# Patient Record
Sex: FEMALE | Race: WHITE
[De-identification: names, ages, dates, MRNs, and addresses within clinical notes are randomized per-mention and may not be internally consistent; named-entity substitution may affect disease eponyms.]

---

## 2018-04-09 ENCOUNTER — HOSPITAL ENCOUNTER (OUTPATIENT)
Dept: HOSPITAL 62 - OD | Age: 23
End: 2018-04-09
Attending: FAMILY MEDICINE
Payer: COMMERCIAL

## 2018-04-09 DIAGNOSIS — E66.3: Primary | ICD-10-CM

## 2018-04-09 DIAGNOSIS — Z82.3: ICD-10-CM

## 2018-04-09 LAB
ADD MANUAL DIFF: NO
ALBUMIN SERPL-MCNC: 4.5 G/DL (ref 3.5–5)
ALP SERPL-CCNC: 73 U/L (ref 38–126)
ALT SERPL-CCNC: 31 U/L (ref 9–52)
ANION GAP SERPL CALC-SCNC: 8 MMOL/L (ref 5–19)
AST SERPL-CCNC: 21 U/L (ref 14–36)
BASOPHILS # BLD AUTO: 0 10^3/UL (ref 0–0.2)
BASOPHILS NFR BLD AUTO: 0.6 % (ref 0–2)
BILIRUB DIRECT SERPL-MCNC: 0.1 MG/DL (ref 0–0.4)
BILIRUB SERPL-MCNC: 0.3 MG/DL (ref 0.2–1.3)
BUN SERPL-MCNC: 20 MG/DL (ref 7–20)
CALCIUM: 10 MG/DL (ref 8.4–10.2)
CHLORIDE SERPL-SCNC: 105 MMOL/L (ref 98–107)
CO2 SERPL-SCNC: 30 MMOL/L (ref 22–30)
EOSINOPHIL # BLD AUTO: 0.1 10^3/UL (ref 0–0.6)
EOSINOPHIL NFR BLD AUTO: 2.5 % (ref 0–6)
ERYTHROCYTE [DISTWIDTH] IN BLOOD BY AUTOMATED COUNT: 13.4 % (ref 11.5–14)
GLUCOSE SERPL-MCNC: 85 MG/DL (ref 75–110)
HCT VFR BLD CALC: 35.6 % (ref 36–47)
HGB BLD-MCNC: 11.9 G/DL (ref 12–15.5)
LDLC SERPL DIRECT ASSAY-MCNC: 103 MG/DL (ref ?–100)
LYMPHOCYTES # BLD AUTO: 1.5 10^3/UL (ref 0.5–4.7)
LYMPHOCYTES NFR BLD AUTO: 28.3 % (ref 13–45)
MCH RBC QN AUTO: 28 PG (ref 27–33.4)
MCHC RBC AUTO-ENTMCNC: 33.5 G/DL (ref 32–36)
MCV RBC AUTO: 84 FL (ref 80–97)
MONOCYTES # BLD AUTO: 0.4 10^3/UL (ref 0.1–1.4)
MONOCYTES NFR BLD AUTO: 6.9 % (ref 3–13)
NEUTROPHILS # BLD AUTO: 3.4 10^3/UL (ref 1.7–8.2)
NEUTS SEG NFR BLD AUTO: 61.7 % (ref 42–78)
PLATELET # BLD: 198 10^3/UL (ref 150–450)
POTASSIUM SERPL-SCNC: 4.9 MMOL/L (ref 3.6–5)
PROT SERPL-MCNC: 7.3 G/DL (ref 6.3–8.2)
RBC # BLD AUTO: 4.26 10^6/UL (ref 3.72–5.28)
SODIUM SERPL-SCNC: 143.4 MMOL/L (ref 137–145)
TOTAL CELLS COUNTED % (AUTO): 100 %
TRIGL SERPL-MCNC: 36 MG/DL (ref ?–150)
VLDLC SERPL CALC-MCNC: 7 MG/DL (ref 10–31)
WBC # BLD AUTO: 5.4 10^3/UL (ref 4–10.5)

## 2018-04-09 PROCEDURE — 80053 COMPREHEN METABOLIC PANEL: CPT

## 2018-04-09 PROCEDURE — 80061 LIPID PANEL: CPT

## 2018-04-09 PROCEDURE — 36415 COLL VENOUS BLD VENIPUNCTURE: CPT

## 2018-04-09 PROCEDURE — 84443 ASSAY THYROID STIM HORMONE: CPT

## 2018-04-09 PROCEDURE — 85025 COMPLETE CBC W/AUTO DIFF WBC: CPT

## 2018-09-02 ENCOUNTER — HOSPITAL ENCOUNTER (EMERGENCY)
Dept: HOSPITAL 62 - ER | Age: 23
Discharge: HOME | End: 2018-09-02
Payer: MEDICAID

## 2018-09-02 VITALS — DIASTOLIC BLOOD PRESSURE: 56 MMHG | SYSTOLIC BLOOD PRESSURE: 118 MMHG

## 2018-09-02 DIAGNOSIS — O26.891: ICD-10-CM

## 2018-09-02 DIAGNOSIS — Z3A.11: ICD-10-CM

## 2018-09-02 DIAGNOSIS — R10.32: ICD-10-CM

## 2018-09-02 DIAGNOSIS — O20.9: Primary | ICD-10-CM

## 2018-09-02 LAB
ADD MANUAL DIFF: NO
ALBUMIN SERPL-MCNC: 4 G/DL (ref 3.5–5)
ALP SERPL-CCNC: 54 U/L (ref 38–126)
ALT SERPL-CCNC: 21 U/L (ref 9–52)
ANION GAP SERPL CALC-SCNC: 11 MMOL/L (ref 5–19)
APPEARANCE UR: CLEAR
APTT PPP: YELLOW S
AST SERPL-CCNC: 18 U/L (ref 14–36)
BASOPHILS # BLD AUTO: 0 10^3/UL (ref 0–0.2)
BASOPHILS NFR BLD AUTO: 0.5 % (ref 0–2)
BILIRUB DIRECT SERPL-MCNC: 0.3 MG/DL (ref 0–0.4)
BILIRUB SERPL-MCNC: 0.3 MG/DL (ref 0.2–1.3)
BILIRUB UR QL STRIP: NEGATIVE
BUN SERPL-MCNC: 8 MG/DL (ref 7–20)
CALCIUM: 9.3 MG/DL (ref 8.4–10.2)
CHLORIDE SERPL-SCNC: 104 MMOL/L (ref 98–107)
CO2 SERPL-SCNC: 27 MMOL/L (ref 22–30)
EOSINOPHIL # BLD AUTO: 0.1 10^3/UL (ref 0–0.6)
EOSINOPHIL NFR BLD AUTO: 2.4 % (ref 0–6)
ERYTHROCYTE [DISTWIDTH] IN BLOOD BY AUTOMATED COUNT: 14.1 % (ref 11.5–14)
GLUCOSE SERPL-MCNC: 87 MG/DL (ref 75–110)
GLUCOSE UR STRIP-MCNC: NEGATIVE MG/DL
HCT VFR BLD CALC: 33.6 % (ref 36–47)
HGB BLD-MCNC: 11.4 G/DL (ref 12–15.5)
KETONES UR STRIP-MCNC: NEGATIVE MG/DL
LIPASE SERPL-CCNC: 47.5 U/L (ref 23–300)
LYMPHOCYTES # BLD AUTO: 1.3 10^3/UL (ref 0.5–4.7)
LYMPHOCYTES NFR BLD AUTO: 23.4 % (ref 13–45)
MCH RBC QN AUTO: 28.5 PG (ref 27–33.4)
MCHC RBC AUTO-ENTMCNC: 34 G/DL (ref 32–36)
MCV RBC AUTO: 84 FL (ref 80–97)
MONOCYTES # BLD AUTO: 0.4 10^3/UL (ref 0.1–1.4)
MONOCYTES NFR BLD AUTO: 6.5 % (ref 3–13)
NEUTROPHILS # BLD AUTO: 3.7 10^3/UL (ref 1.7–8.2)
NEUTS SEG NFR BLD AUTO: 67.2 % (ref 42–78)
NITRITE UR QL STRIP: NEGATIVE
PH UR STRIP: 6 [PH] (ref 5–9)
PLATELET # BLD: 219 10^3/UL (ref 150–450)
POTASSIUM SERPL-SCNC: 4.5 MMOL/L (ref 3.6–5)
PROT SERPL-MCNC: 7.1 G/DL (ref 6.3–8.2)
PROT UR STRIP-MCNC: NEGATIVE MG/DL
RBC # BLD AUTO: 4.02 10^6/UL (ref 3.72–5.28)
SODIUM SERPL-SCNC: 142 MMOL/L (ref 137–145)
SP GR UR STRIP: 1.02
TOTAL CELLS COUNTED % (AUTO): 100 %
UROBILINOGEN UR-MCNC: NEGATIVE MG/DL (ref ?–2)
WBC # BLD AUTO: 5.5 10^3/UL (ref 4–10.5)

## 2018-09-02 PROCEDURE — 85025 COMPLETE CBC W/AUTO DIFF WBC: CPT

## 2018-09-02 PROCEDURE — 81001 URINALYSIS AUTO W/SCOPE: CPT

## 2018-09-02 PROCEDURE — 99284 EMERGENCY DEPT VISIT MOD MDM: CPT

## 2018-09-02 PROCEDURE — 86901 BLOOD TYPING SEROLOGIC RH(D): CPT

## 2018-09-02 PROCEDURE — 86900 BLOOD TYPING SEROLOGIC ABO: CPT

## 2018-09-02 PROCEDURE — 83690 ASSAY OF LIPASE: CPT

## 2018-09-02 PROCEDURE — 84702 CHORIONIC GONADOTROPIN TEST: CPT

## 2018-09-02 PROCEDURE — 76801 OB US < 14 WKS SINGLE FETUS: CPT

## 2018-09-02 PROCEDURE — 36415 COLL VENOUS BLD VENIPUNCTURE: CPT

## 2018-09-02 PROCEDURE — 80053 COMPREHEN METABOLIC PANEL: CPT

## 2018-09-02 PROCEDURE — 93976 VASCULAR STUDY: CPT

## 2018-09-02 NOTE — RADIOLOGY REPORT (SQ)
EXAM DESCRIPTION:  U/S 1TRIMESTER/1GEST W/DOPPLER



COMPLETED DATE/TIME:  9/2/2018 3:09 pm



REASON FOR STUDY:  +preg vag bleed



COMPARISON:  None.



TECHNIQUE:  Transabdominal static and realtime grayscale images acquired of the pelvis. Additional se
lected spectral and color Doppler images recorded. All images stored on PACs.

bHCG: Pending

CLINICAL DATES:  Last menses 6/16/2018



LIMITATIONS:  Right and left adnexa difficult to visualize due to bowel gas



FINDINGS:  FETUS: Living intrauterine pregnancy.

ULTRASOUND EGA: 11 weeks 0 days

ULTRASOUND MORIS: 3/24/2019

CRL:  4.1 cm

FHR: 171  beats per minute.

SUBCHORIONIC BLEED: No uterus is 10 x 9 x 8 cm in size

SIZE OF BLEED: Not applicable.

UTERUS: No masses. No anomalies.

CERVICAL LENGTH: 3.2 cm   Closed.

RIGHT ADNEXA: Not well visualized due to bowel gas

LEFT ADNEXA: Not well visualized due to bowel gas

FREE FLUID: None.

OTHER: No other significant finding.



IMPRESSION:  LIVING INTRAUTERINE PREGNANCY.

EGA 11 weeks 0 days

Trimester of pregnancy:  First - 0 to 13 weeks.



TECHNICAL DOCUMENTATION:  JOB ID:  [id number]

 2011 [employer]- All Rights Reserved                            rev-5/18



Reading location - IP/workstation name: [email or web address]

## 2018-09-02 NOTE — ER DOCUMENT REPORT
ED Medical Screen (RME)





- General


Chief Complaint: Vag Bleeding, +preg <12wks


Stated Complaint: ABDOMINAL PAIN


Time Seen by Provider: 18 14:41


TRAVEL OUTSIDE OF THE [place] IN LAST 30 DAYS: No





- HPI


Notes: 





18 15:17


Pregnant and bleeding 





- Related Data


Allergies/Adverse Reactions: 


 





No Known Allergies Allergy (Verified 18 13:20)


 











Past Medical History


Renal/ Medical History: Denies: Hx Peritoneal Dialysis





Review of Systems





- Review of Systems


Female Genitourinary: Vaginal bleeding





Physical Exam





- Vital signs


Vitals: 





 











Temp Pulse Resp BP Pulse Ox


 


 98.2 F   77   14   118/56 L  100 


 


 18 13:22  18 13:22  18 13:22  18 13:22  18 13:22














- Respiratory


Respiratory status: No respiratory distress


Chest status: Nontender


Breath sounds: Normal


Chest palpation: Normal





- Cardiovascular


Rhythm: Regular


Heart sounds: Normal auscultation





Course





- Vital Signs


Vital signs: 





 











Temp Pulse Resp BP Pulse Ox


 


 98.2 F   77   14   118/56 L  100 


 


 18 13:22  18 13:22  18 13:22  18 13:22  18 13:22














- Laboratory


Result Diagrams: 


 18 14:55





 18 14:55


Laboratory results interpreted by me: 





 











  18





  14:55


 


Hgb  11.4 L


 


Hct  33.6 L


 


RDW  14.1 H

## 2018-09-02 NOTE — ER DOCUMENT REPORT
ED General





- General


Chief Complaint: Vag Bleeding, +preg <12wks


Stated Complaint: ABDOMINAL PAIN


Time Seen by Provider: 18 14:41


Mode of Arrival: Ambulatory


Information source: Patient


Notes: 








Patient is a  2 para  who is 11 weeks pregnant presents with chief 

complaint of possible vaginal bleeding with left lower quadrant cramping.  

Patient reports that [place] the mall she urinated, and when she wipes 

there is a small amount of blood on the toilet paper.  Patient denies any 

active bleeding.


TRAVEL OUTSIDE OF THE [place] IN LAST 30 DAYS: No





- Related Data


Allergies/Adverse Reactions: 


 





No Known Allergies Allergy (Verified 18 13:20)


 











Past Medical History





- General


Information source: Patient





- Social History


Smoking Status: Never Smoker


Lives with: Family


Family History: Reviewed & Not Pertinent


Patient has suicidal ideation: No


Patient has homicidal ideation: No





- Medical History


Medical History: Negative


Renal/ Medical History: Denies: Hx Peritoneal Dialysis


Surgical Hx: Negative





- Immunizations


Immunizations up to date: Yes





Review of Systems





- Review of Systems


Gastrointestinal: Abdominal pain - Left lower quadrant


Female Genitourinary: Vaginal bleeding





Physical Exam





- Vital signs


Vitals: 


 











Temp Pulse Resp BP Pulse Ox


 


 98.2 F   77   14   118/56 L  100 


 


 18 13:22  18 13:22  18 13:22  18 13:22  18 13:22














- Notes


Notes: 





PHYSICAL EXAMINATION:





GENERAL: Well-appearing, well-nourished and in no acute distress.





HEAD: Atraumatic, normocephalic.





EYES: Pupils equal round and reactive to light, extraocular movements intact, 

conjunctiva are normal.





ENT: Nares patent, oropharynx clear without exudates.  Moist mucous membranes.





NECK: Normal range of motion, supple without lymphadenopathy





LUNGS: Breath sounds clear to auscultation bilaterally and equal.  No wheezes 

rales or rhonchi.





HEART: Regular rate and rhythm without murmurs





ABDOMEN: Soft, nontender, nondistended abdomen.  No guarding, no rebound.  No 

masses appreciated.





Female : No CVA tenderness.





Musculoskeletal: Normal range of motion, no pitting or edema.  No cyanosis.





NEUROLOGICAL: Cranial nerves grossly intact.  Normal speech, normal gait.  

Normal sensory, motor exams





PSYCH: Normal mood, normal affect.





SKIN: Warm, Dry, normal turgor, no rashes or lesions noted.





Course





- Re-evaluation


Re-evalutation: 





CBC, comprehensive metabolic panel are all within normal limits.  Quantitative 

hCG is 99,039. Transvaginal ultrasound reveals a viable intrauterine pregnancy, 

approximate gestational age 11 weeks.  There is no subchorionic bleed.  Patient 

will be discharged home at this time, will return in 24-48 hours for repeat 

quantitative hCG.  Patient given strict ED return precautions.








- Vital Signs


Vital signs: 


 











Temp Pulse Resp BP Pulse Ox


 


 98.2 F   77   14   118/56 L  100 


 


 18 13:22  18 13:22  18 13:22  18 13:22  18 13:22














- Laboratory


Result Diagrams: 


 18 14:55





 18 14:55


Laboratory results interpreted by me: 


 











  18





  14:55 14:55 14:55


 


Hgb  11.4 L  


 


Hct  33.6 L  


 


RDW  14.1 H  


 


Beta HCG, Quant   96033.00 H 


 


Urine Ascorbic Acid    40 H














Discharge





- Discharge


Clinical Impression: 


 Vaginal bleeding affecting early pregnancy, Left sided abdominal pain





Condition: Stable


Disposition: HOME, SELF-CARE


Additional Instructions: 





BLEEDING DURING EARLY PREGNANCY:





     You have been evaluated for passing blood while pregnant. While we take 

this symptom very seriously, most women with your degree of bleeding will go on 

to have a perfectly normal baby. At this time, there is no indication that a 

miscarriage will occur. (A miscarriage occurs when the fetus is abnormal.  

There is no medicine or treatment to prevent it.)


     A more serious cause of bleeding is tubal (or ectopic) pregnancy. An 

ultrasound usually can show whether the pregnancy is in the uterus or in the 

tube. Sometimes in early pregnancy, no fetus is seen. In this case, careful 

follow-up, including repeat blood tests and repeat ultrasound, is necessary.


     Do not douche or have sex for at least a week, or until OK'd by the 

doctor. Don't use tampons.


     Call the doctor or return for re-examination if there is an increase in 

bleeding or cramping, extreme weakness, fainting, new abdominal pain, fever, or 

passage of tissue.








THREATENED MISCARRIAGE:





     You have been evaluated for a possible miscarriage.  At this time, there 

is no indication that a miscarriage will occur.  Most women with your symptoms 

will go on to have a perfectly normal baby.  However, careful observation will 

be necessary.


     A miscarriage occurs when the fetus is abnormal.  There is no medicine or 

treatment for it.


     You should rest in bed until the symptoms have resolved.  Do not douche or 

have sex for at least a week, or until OK'd by the doctor.


     Call the doctor or return for re-examination if there is an increase in 

bleeding or cramping, or passage of tissue.








REPEAT BLOOD TEST:


     At this time, it is uncertain if you have a viable pregnancy.  During the 

first three months of pregnancy, the pregnancy hormone produced from the 

placenta will steadily rise, usually doubling in value every 2 - 3 days.  In 

order to determine if your pregnancy is viable and likely be succesful, a 

repeat of this blood test for the pregnancy hormone is recommended in 2 - 3 

days.  An order for this test to be done as an outpatient is being provided.  

After you have this repeat test done, call your doctor or call us for the 

results.  If the value of the test is increasing as would be expected in a 

normal pregnancy, then your pregnancy is likely to be ok.  However, if the 

value of the test is declining, it will suggest something has happened with 

your pregnancy and it will not likely be a successful pregnancy.








FOLLOW-UP CARE:


If you have been referred to a physician for follow-up care, call the physician

s office for an appointment as you were instructed or within the next two days.

  If you experience worsening or a significant change in your symptoms (very 

heavy bleeding with large clots of blood, passage of tissue, more severe 

abdominal / pelvic pain or cramping, feeling faint or severe weakness, fever, 

etc.), notify the physician immediately or return to the Emergency Department 

at any time for re-evaluation.





[facility]OLOG[facility] (OB-GYN) PHYSICIANS IN [place]:





[facility]


    [address] [place] [address]


    [phone]





***Please return to the laboratory in the main hospital for a repeat hCG level 

in the next 24-48 hours.  Please call your OB/GYN Tuesday morning to schedule a 

follow-up appointment.  Please return to the emergency department should you 

develop worsening vaginal bleeding, bleeding through more than 1 pad per hour.  

Please abstain from inserting anything into the vagina until cleared by your OB/

GYN provider.***


Forms:  Follow-Up Laboratory Testing


Referrals: 


[facility] [Provider Group] - Follow up as needed

## 2019-02-01 ENCOUNTER — HOSPITAL ENCOUNTER (OUTPATIENT)
Dept: HOSPITAL 62 - LC | Age: 24
Discharge: HOME | End: 2019-02-01
Attending: OBSTETRICS & GYNECOLOGY
Payer: MEDICAID

## 2019-02-01 DIAGNOSIS — O47.03: Primary | ICD-10-CM

## 2019-02-01 DIAGNOSIS — Z3A.32: ICD-10-CM

## 2019-02-01 LAB
APPEARANCE UR: CLEAR
APTT PPP: YELLOW S
BARBITURATES UR QL SCN: NEGATIVE
BILIRUB UR QL STRIP: NEGATIVE
GLUCOSE UR STRIP-MCNC: NEGATIVE MG/DL
KETONES UR STRIP-MCNC: NEGATIVE MG/DL
METHADONE UR QL SCN: NEGATIVE
NITRITE UR QL STRIP: NEGATIVE
PCP UR QL SCN: NEGATIVE
PH UR STRIP: 6 [PH] (ref 5–9)
PROT UR STRIP-MCNC: NEGATIVE MG/DL
SP GR UR STRIP: 1.01
URINE AMPHETAMINES SCREEN: NEGATIVE
URINE BENZODIAZEPINES SCREEN: NEGATIVE
URINE COCAINE SCREEN: NEGATIVE
URINE MARIJUANA (THC) SCREEN: NEGATIVE
UROBILINOGEN UR-MCNC: NEGATIVE MG/DL (ref ?–2)

## 2019-02-01 PROCEDURE — 80307 DRUG TEST PRSMV CHEM ANLYZR: CPT

## 2019-02-01 PROCEDURE — 59025 FETAL NON-STRESS TEST: CPT

## 2019-02-01 PROCEDURE — 81001 URINALYSIS AUTO W/SCOPE: CPT

## 2019-02-01 PROCEDURE — 4A1HXCZ MONITORING OF PRODUCTS OF CONCEPTION, CARDIAC RATE, EXTERNAL APPROACH: ICD-10-PCS | Performed by: OBSTETRICS & GYNECOLOGY

## 2019-02-01 NOTE — NON STRESS TEST REPORT
=================================================================

Non Stress Test

=================================================================

Datetime Report Generated by CPN: 02/01/2019 18:36

   

   

=================================================================

DEMOGRAPHIC

=================================================================

   

EGA NST:  32.6

   

=================================================================

INDICATION

=================================================================

   

Indication for Study:  Ordered by Provider

   

=================================================================

MONITORING

=================================================================

   

Monitor Explained:  Monitor Explained; Test Explained

Time on Monitor:  02/01/2019 16:39

Time off Monitor:  02/01/2019 17:53

NST Duration:  74

   

=================================================================

NST INTERVENTIONS

=================================================================

   

NST Interventions:  PO Hydration

Physician Notified NST:  Dr. Ni

BABY A:  [id number]

   

=================================================================

BABY A

=================================================================

   

Fetal Movement :  Present

Contraction Frequency :  x2

FHR Baseline :  140

Accelerations :  15X15

Decelerations :  None

Variability :  Moderate 6-25bpm

NST Review:  Meets Criteria for Reactive NST

NST Review and Verified By :  MAGDALENA templeton RN

NST Results:  Reactive

   

=================================================================

NST REPORT

=================================================================

   

Report Trigger:  Send Report

## 2019-02-27 ENCOUNTER — HOSPITAL ENCOUNTER (OUTPATIENT)
Dept: HOSPITAL 62 - LC | Age: 24
Discharge: HOME | End: 2019-02-27
Attending: OBSTETRICS & GYNECOLOGY
Payer: MEDICAID

## 2019-02-27 DIAGNOSIS — Z3A.36: ICD-10-CM

## 2019-02-27 DIAGNOSIS — O16.3: Primary | ICD-10-CM

## 2019-02-27 LAB
ADD MANUAL DIFF: NO
ALBUMIN SERPL-MCNC: 3.3 G/DL (ref 3.5–5)
ALP SERPL-CCNC: 127 U/L (ref 38–126)
ALT SERPL-CCNC: < 6 U/L (ref 9–52)
ANION GAP SERPL CALC-SCNC: 10 MMOL/L (ref 5–19)
APPEARANCE UR: (no result)
APTT PPP: YELLOW S
AST SERPL-CCNC: 12 U/L (ref 14–36)
BARBITURATES UR QL SCN: NEGATIVE
BASOPHILS # BLD AUTO: 0 10^3/UL (ref 0–0.2)
BASOPHILS NFR BLD AUTO: 0.4 % (ref 0–2)
BILIRUB DIRECT SERPL-MCNC: 0.2 MG/DL (ref 0–0.4)
BILIRUB SERPL-MCNC: 0.2 MG/DL (ref 0.2–1.3)
BILIRUB UR QL STRIP: NEGATIVE
BUN SERPL-MCNC: 6 MG/DL (ref 7–20)
CALCIUM: 9.2 MG/DL (ref 8.4–10.2)
CHLORIDE SERPL-SCNC: 107 MMOL/L (ref 98–107)
CO2 SERPL-SCNC: 20 MMOL/L (ref 22–30)
CREAT UR-MCNC: 266.9 MG/DL (ref 16–327)
EOSINOPHIL # BLD AUTO: 0.2 10^3/UL (ref 0–0.6)
EOSINOPHIL NFR BLD AUTO: 2.4 % (ref 0–6)
ERYTHROCYTE [DISTWIDTH] IN BLOOD BY AUTOMATED COUNT: 14.7 % (ref 11.5–14)
GLUCOSE SERPL-MCNC: 105 MG/DL (ref 75–110)
GLUCOSE UR STRIP-MCNC: 50 MG/DL
HCT VFR BLD CALC: 27.1 % (ref 36–47)
HGB BLD-MCNC: 9.3 G/DL (ref 12–15.5)
KETONES UR STRIP-MCNC: NEGATIVE MG/DL
LYMPHOCYTES # BLD AUTO: 1.3 10^3/UL (ref 0.5–4.7)
LYMPHOCYTES NFR BLD AUTO: 18.4 % (ref 13–45)
MCH RBC QN AUTO: 26.3 PG (ref 27–33.4)
MCHC RBC AUTO-ENTMCNC: 34.3 G/DL (ref 32–36)
MCV RBC AUTO: 77 FL (ref 80–97)
METHADONE UR QL SCN: NEGATIVE
MONOCYTES # BLD AUTO: 0.5 10^3/UL (ref 0.1–1.4)
MONOCYTES NFR BLD AUTO: 6.8 % (ref 3–13)
NEUTROPHILS # BLD AUTO: 5 10^3/UL (ref 1.7–8.2)
NEUTS SEG NFR BLD AUTO: 72 % (ref 42–78)
NITRITE UR QL STRIP: NEGATIVE
PCP UR QL SCN: NEGATIVE
PH UR STRIP: 6 [PH] (ref 5–9)
PLATELET # BLD: 226 10^3/UL (ref 150–450)
POTASSIUM SERPL-SCNC: 4 MMOL/L (ref 3.6–5)
PROT SERPL-MCNC: 6.1 G/DL (ref 6.3–8.2)
PROT UR STRIP-MCNC: 15.6 MG/DL (ref ?–12)
PROT UR STRIP-MCNC: 30 MG/DL
RBC # BLD AUTO: 3.54 10^6/UL (ref 3.72–5.28)
SODIUM SERPL-SCNC: 136.8 MMOL/L (ref 137–145)
SP GR UR STRIP: 1.03
TOTAL CELLS COUNTED % (AUTO): 100 %
UR PRO/CREAT RATIO RESULT: 0.1 MG/MG (ref 0–0.2)
URATE SERPL-MCNC: 4.5 MG/DL (ref 2.5–6.2)
URINE AMPHETAMINES SCREEN: NEGATIVE
URINE BENZODIAZEPINES SCREEN: NEGATIVE
URINE COCAINE SCREEN: NEGATIVE
URINE MARIJUANA (THC) SCREEN: NEGATIVE
UROBILINOGEN UR-MCNC: NEGATIVE MG/DL (ref ?–2)
WBC # BLD AUTO: 7 10^3/UL (ref 4–10.5)

## 2019-02-27 PROCEDURE — 81001 URINALYSIS AUTO W/SCOPE: CPT

## 2019-02-27 PROCEDURE — 82570 ASSAY OF URINE CREATININE: CPT

## 2019-02-27 PROCEDURE — 85025 COMPLETE CBC W/AUTO DIFF WBC: CPT

## 2019-02-27 PROCEDURE — 36415 COLL VENOUS BLD VENIPUNCTURE: CPT

## 2019-02-27 PROCEDURE — 80307 DRUG TEST PRSMV CHEM ANLYZR: CPT

## 2019-02-27 PROCEDURE — 84156 ASSAY OF PROTEIN URINE: CPT

## 2019-02-27 PROCEDURE — 83615 LACTATE (LD) (LDH) ENZYME: CPT

## 2019-02-27 PROCEDURE — 59025 FETAL NON-STRESS TEST: CPT

## 2019-02-27 PROCEDURE — 4A1HXCZ MONITORING OF PRODUCTS OF CONCEPTION, CARDIAC RATE, EXTERNAL APPROACH: ICD-10-PCS | Performed by: OBSTETRICS & GYNECOLOGY

## 2019-02-27 PROCEDURE — 84550 ASSAY OF BLOOD/URIC ACID: CPT

## 2019-02-27 PROCEDURE — 80053 COMPREHEN METABOLIC PANEL: CPT

## 2019-02-27 NOTE — NON STRESS TEST REPORT
=================================================================

Non Stress Test

=================================================================

Datetime Report Generated by CPN: 02/27/2019 17:30

   

   

=================================================================

DEMOGRAPHIC

=================================================================

   

EGA NST:  36.4

   

=================================================================

INDICATION

=================================================================

   

Indication for Study:  Ordered by Provider

   

=================================================================

MONITORING

=================================================================

   

Monitor Explained:  Monitor Explained; Test Explained; Patient

   Verbalized Understanding

Time on Monitor:  02/27/2019 17:00

Time off Monitor:  02/27/2019 17:20

NST Duration:  20

   

=================================================================

NST INTERVENTIONS

=================================================================

   

NST Interventions:  None

Physician Notified NST:  Dr. Younger

BABY A:  [id number]

   

=================================================================

BABY A

=================================================================

   

Fetal Movement :  Present

Contraction Frequency :  none

FHR Baseline :  145

Accelerations :  15X15

Decelerations :  None

Variability :  Moderate 6-25bpm

NST Review:  Meets Criteria for Reactive NST

NST Review and Verified By :  MAGDALENA Prasadavaleny RN

NST Results:  Reactive

   

=================================================================

NST REPORT

=================================================================

   

Report Trigger:  Send Report

## 2019-02-27 NOTE — L&D PROGRESS NOTES
=================================================================

PROGRESS NOTES

=================================================================

Datetime Report Generated by CPN: 02/27/2019 18:13

   

   

=================================================================

PROGRESS NOTE

=================================================================

   

Impression Other:  elevated BP in office

Procedures- Other:  monitor

Plan Other:  PIH work up

Vital Signs :  Reviewed; Within Normal Limits

Comment:  Pt sent from the office to L_D for a PIH work up secondary to

   a headache in the office and elevated BP.  Pt states that she just

   stopped her caffeine intake.  Her PIH work up in negative.  Her BP

   values are normal.  Her headaches resolve with Tylenol.

   

=================================================================

FETUS A

=================================================================

   

FHR - Baseline:  140-150s

Monitoring:  External US

Variability:  Moderate 6-25bpm

Accelerations:  15X15

Decelerations:  None

FHR Category:  Category I

:  36.4

   

=================================================================

SIGNATURE

=================================================================

   

SIGNATURE:  [id number];[id number]

SIGNATURE:  [id number]

SIGNATURE:  [id number]

Signature:  Electronically signed by Nicki Parker MD (MAI) on

   2/27/2019 at 18:13  with User ID: [email or web address]

## 2019-03-11 ENCOUNTER — HOSPITAL ENCOUNTER (OUTPATIENT)
Dept: HOSPITAL 62 - LC | Age: 24
Discharge: HOME | End: 2019-03-11
Attending: OBSTETRICS & GYNECOLOGY
Payer: MEDICAID

## 2019-03-11 DIAGNOSIS — K62.5: ICD-10-CM

## 2019-03-11 DIAGNOSIS — K59.00: ICD-10-CM

## 2019-03-11 DIAGNOSIS — Z3A.38: ICD-10-CM

## 2019-03-11 DIAGNOSIS — O99.613: Primary | ICD-10-CM

## 2019-03-11 LAB
APPEARANCE UR: (no result)
APTT PPP: YELLOW S
BARBITURATES UR QL SCN: NEGATIVE
BILIRUB UR QL STRIP: NEGATIVE
GLUCOSE UR STRIP-MCNC: NEGATIVE MG/DL
KETONES UR STRIP-MCNC: NEGATIVE MG/DL
METHADONE UR QL SCN: NEGATIVE
NITRITE UR QL STRIP: NEGATIVE
PCP UR QL SCN: NEGATIVE
PH UR STRIP: 6 [PH] (ref 5–9)
PROT UR STRIP-MCNC: 30 MG/DL
SP GR UR STRIP: 1.02
URINE AMPHETAMINES SCREEN: NEGATIVE
URINE BENZODIAZEPINES SCREEN: NEGATIVE
URINE COCAINE SCREEN: NEGATIVE
URINE MARIJUANA (THC) SCREEN: NEGATIVE
UROBILINOGEN UR-MCNC: NEGATIVE MG/DL (ref ?–2)

## 2019-03-11 PROCEDURE — 59025 FETAL NON-STRESS TEST: CPT

## 2019-03-11 PROCEDURE — 80307 DRUG TEST PRSMV CHEM ANLYZR: CPT

## 2019-03-11 PROCEDURE — 81005 URINALYSIS: CPT

## 2019-03-11 PROCEDURE — 4A1HXCZ MONITORING OF PRODUCTS OF CONCEPTION, CARDIAC RATE, EXTERNAL APPROACH: ICD-10-PCS | Performed by: OBSTETRICS & GYNECOLOGY

## 2019-03-13 ENCOUNTER — HOSPITAL ENCOUNTER (OUTPATIENT)
Dept: HOSPITAL 62 - LC | Age: 24
Discharge: HOME | End: 2019-03-13
Attending: OBSTETRICS & GYNECOLOGY
Payer: MEDICAID

## 2019-03-13 DIAGNOSIS — Z3A.38: ICD-10-CM

## 2019-03-13 DIAGNOSIS — O14.93: Primary | ICD-10-CM

## 2019-03-13 LAB
ADD MANUAL DIFF: NO
ALBUMIN SERPL-MCNC: 3.3 G/DL (ref 3.5–5)
ALP SERPL-CCNC: 143 U/L (ref 38–126)
ALT SERPL-CCNC: 8 U/L (ref 9–52)
ANION GAP SERPL CALC-SCNC: 11 MMOL/L (ref 5–19)
APPEARANCE UR: (no result)
APTT PPP: YELLOW S
AST SERPL-CCNC: 12 U/L (ref 14–36)
BARBITURATES UR QL SCN: NEGATIVE
BASOPHILS # BLD AUTO: 0 10^3/UL (ref 0–0.2)
BASOPHILS NFR BLD AUTO: 0.3 % (ref 0–2)
BILIRUB DIRECT SERPL-MCNC: 0.2 MG/DL (ref 0–0.4)
BILIRUB SERPL-MCNC: 0.3 MG/DL (ref 0.2–1.3)
BILIRUB UR QL STRIP: NEGATIVE
BUN SERPL-MCNC: 8 MG/DL (ref 7–20)
CALCIUM: 10.1 MG/DL (ref 8.4–10.2)
CHLORIDE SERPL-SCNC: 104 MMOL/L (ref 98–107)
CO2 SERPL-SCNC: 22 MMOL/L (ref 22–30)
CREAT UR-MCNC: 268.5 MG/DL (ref 16–327)
EOSINOPHIL # BLD AUTO: 0.1 10^3/UL (ref 0–0.6)
EOSINOPHIL NFR BLD AUTO: 1.3 % (ref 0–6)
ERYTHROCYTE [DISTWIDTH] IN BLOOD BY AUTOMATED COUNT: 15.1 % (ref 11.5–14)
GLUCOSE SERPL-MCNC: 118 MG/DL (ref 75–110)
GLUCOSE UR STRIP-MCNC: NEGATIVE MG/DL
HCT VFR BLD CALC: 27.8 % (ref 36–47)
HGB BLD-MCNC: 9.4 G/DL (ref 12–15.5)
KETONES UR STRIP-MCNC: (no result) MG/DL
LYMPHOCYTES # BLD AUTO: 1.2 10^3/UL (ref 0.5–4.7)
LYMPHOCYTES NFR BLD AUTO: 15.9 % (ref 13–45)
MCH RBC QN AUTO: 25.8 PG (ref 27–33.4)
MCHC RBC AUTO-ENTMCNC: 33.9 G/DL (ref 32–36)
MCV RBC AUTO: 76 FL (ref 80–97)
METHADONE UR QL SCN: NEGATIVE
MONOCYTES # BLD AUTO: 0.4 10^3/UL (ref 0.1–1.4)
MONOCYTES NFR BLD AUTO: 4.9 % (ref 3–13)
NEUTROPHILS # BLD AUTO: 5.7 10^3/UL (ref 1.7–8.2)
NEUTS SEG NFR BLD AUTO: 77.6 % (ref 42–78)
NITRITE UR QL STRIP: NEGATIVE
PCP UR QL SCN: NEGATIVE
PH UR STRIP: 7 [PH] (ref 5–9)
PLATELET # BLD: 218 10^3/UL (ref 150–450)
POTASSIUM SERPL-SCNC: 4.1 MMOL/L (ref 3.6–5)
PROT SERPL-MCNC: 6.1 G/DL (ref 6.3–8.2)
PROT UR STRIP-MCNC: 100 MG/DL
PROT UR STRIP-MCNC: 16.7 MG/DL (ref ?–12)
RBC # BLD AUTO: 3.65 10^6/UL (ref 3.72–5.28)
SODIUM SERPL-SCNC: 136.5 MMOL/L (ref 137–145)
SP GR UR STRIP: 1.03
TOTAL CELLS COUNTED % (AUTO): 100 %
UR PRO/CREAT RATIO RESULT: 0.1 MG/MG (ref 0–0.2)
URATE SERPL-MCNC: 5 MG/DL (ref 2.5–6.2)
URINE AMPHETAMINES SCREEN: NEGATIVE
URINE BENZODIAZEPINES SCREEN: NEGATIVE
URINE COCAINE SCREEN: NEGATIVE
URINE MARIJUANA (THC) SCREEN: NEGATIVE
UROBILINOGEN UR-MCNC: NEGATIVE MG/DL (ref ?–2)
WBC # BLD AUTO: 7.4 10^3/UL (ref 4–10.5)

## 2019-03-13 PROCEDURE — 84550 ASSAY OF BLOOD/URIC ACID: CPT

## 2019-03-13 PROCEDURE — 80307 DRUG TEST PRSMV CHEM ANLYZR: CPT

## 2019-03-13 PROCEDURE — 59025 FETAL NON-STRESS TEST: CPT

## 2019-03-13 PROCEDURE — 80053 COMPREHEN METABOLIC PANEL: CPT

## 2019-03-13 PROCEDURE — 82570 ASSAY OF URINE CREATININE: CPT

## 2019-03-13 PROCEDURE — 84156 ASSAY OF PROTEIN URINE: CPT

## 2019-03-13 PROCEDURE — 81001 URINALYSIS AUTO W/SCOPE: CPT

## 2019-03-13 PROCEDURE — 83615 LACTATE (LD) (LDH) ENZYME: CPT

## 2019-03-13 PROCEDURE — 85025 COMPLETE CBC W/AUTO DIFF WBC: CPT

## 2019-03-13 PROCEDURE — 4A1HXCZ MONITORING OF PRODUCTS OF CONCEPTION, CARDIAC RATE, EXTERNAL APPROACH: ICD-10-PCS | Performed by: OBSTETRICS & GYNECOLOGY

## 2019-03-13 PROCEDURE — 36415 COLL VENOUS BLD VENIPUNCTURE: CPT

## 2019-03-13 NOTE — NON STRESS TEST REPORT
=================================================================

Non Stress Test

=================================================================

Datetime Report Generated by CPN: 03/13/2019 19:52

   

   

=================================================================

DEMOGRAPHIC

=================================================================

   

Test Number:  5

EGA NST:  38.4

   

=================================================================

INDICATION

=================================================================

   

Indication for Study:  Ordered by Provider

   

=================================================================

MONITORING

=================================================================

   

Monitor Explained:  Monitor Explained; Test Explained; Patient

   Verbalized Understanding

Time on Monitor:  03/13/2019 17:35

Time off Monitor:  03/13/2019 19:40

NST Duration:  125

   

=================================================================

NST INTERVENTIONS

=================================================================

   

NST Interventions:  PO Hydration; Reposition Patient

Physician Notified NST:  Dr. Whaley

   

=================================================================

BABY A

=================================================================

   

Fetal Movement :  Present

Contraction Frequency :  none

FHR Baseline :  135

Accelerations :  15X15

Decelerations :  None

Variability :  Moderate 6-25bpm

NST Review:  Meets Criteria for Reactive NST

NST Review and Verified By :  NUHA Lei

NSDIANN Results:  Reactive

   

=================================================================

NST REPORT

=================================================================

   

Report Trigger:  Send Report

## 2019-03-13 NOTE — NON STRESS TEST REPORT
=================================================================

Non Stress Test

=================================================================

Datetime Report Generated by CPN: 03/13/2019 17:25

   

   

=================================================================

DEMOGRAPHIC

=================================================================

   

EGA NST:  38.2

   

=================================================================

INDICATION

=================================================================

   

Indication for Study:  Ordered by Provider; Other

Indication for Study (NST) Other:  labor check

   

=================================================================

MONITORING

=================================================================

   

Monitor Explained:  Monitor Explained; Test Explained; Patient

   Verbalized Understanding

Time on Monitor:  03/11/2019 11:11

Time off Monitor:  03/11/2019 11:34

NST Duration:  23

   

=================================================================

NST INTERVENTIONS

=================================================================

   

NST Interventions:  Reposition Patient

Physician Notified NST:  ALBANIA Adams CNM

BABY A:  [id number]

   

=================================================================

BABY A

=================================================================

   

Fetal Movement :  Present

Contraction Frequency :  none

FHR Baseline :  135

Accelerations :  15X15

Accelerations :  15X15

Decelerations :  None

Variability :  Moderate 6-25bpm

NST Review:  Meets Criteria for Reactive NST

NST Review:  Meets Criteria for Reactive NST

NST Review and Verified By :  CARY HAYES Results:  Reactive

NST Results:  Reactive

   

=================================================================

NST REPORT

=================================================================

   

Report Trigger:  Send Report

## 2019-03-21 ENCOUNTER — HOSPITAL ENCOUNTER (INPATIENT)
Dept: HOSPITAL 62 - LR | Age: 24
LOS: 2 days | Discharge: HOME | End: 2019-03-23
Attending: OBSTETRICS & GYNECOLOGY | Admitting: OBSTETRICS & GYNECOLOGY
Payer: MEDICAID

## 2019-03-21 DIAGNOSIS — Z3A.39: ICD-10-CM

## 2019-03-21 LAB
ADD MANUAL DIFF: NO
APPEARANCE UR: (no result)
APTT PPP: YELLOW S
BARBITURATES UR QL SCN: NEGATIVE
BASOPHILS # BLD AUTO: 0.1 10^3/UL (ref 0–0.2)
BASOPHILS NFR BLD AUTO: 0.6 % (ref 0–2)
BILIRUB UR QL STRIP: NEGATIVE
EOSINOPHIL # BLD AUTO: 0.1 10^3/UL (ref 0–0.6)
EOSINOPHIL NFR BLD AUTO: 1.4 % (ref 0–6)
ERYTHROCYTE [DISTWIDTH] IN BLOOD BY AUTOMATED COUNT: 15.3 % (ref 11.5–14)
GLUCOSE UR STRIP-MCNC: 50 MG/DL
HCT VFR BLD CALC: 27.5 % (ref 36–47)
HGB BLD-MCNC: 9.4 G/DL (ref 12–15.5)
KETONES UR STRIP-MCNC: NEGATIVE MG/DL
LYMPHOCYTES # BLD AUTO: 2.1 10^3/UL (ref 0.5–4.7)
LYMPHOCYTES NFR BLD AUTO: 24.6 % (ref 13–45)
MCH RBC QN AUTO: 25.6 PG (ref 27–33.4)
MCHC RBC AUTO-ENTMCNC: 34.3 G/DL (ref 32–36)
MCV RBC AUTO: 75 FL (ref 80–97)
METHADONE UR QL SCN: NEGATIVE
MONOCYTES # BLD AUTO: 0.6 10^3/UL (ref 0.1–1.4)
MONOCYTES NFR BLD AUTO: 7.5 % (ref 3–13)
NEUTROPHILS # BLD AUTO: 5.5 10^3/UL (ref 1.7–8.2)
NEUTS SEG NFR BLD AUTO: 65.9 % (ref 42–78)
NITRITE UR QL STRIP: NEGATIVE
PCP UR QL SCN: NEGATIVE
PH UR STRIP: 6 [PH] (ref 5–9)
PLATELET # BLD: 190 10^3/UL (ref 150–450)
PROT UR STRIP-MCNC: 100 MG/DL
RBC # BLD AUTO: 3.69 10^6/UL (ref 3.72–5.28)
SP GR UR STRIP: 1.02
TOTAL CELLS COUNTED % (AUTO): 100 %
URINE AMPHETAMINES SCREEN: NEGATIVE
URINE BENZODIAZEPINES SCREEN: NEGATIVE
URINE COCAINE SCREEN: NEGATIVE
URINE MARIJUANA (THC) SCREEN: NEGATIVE
UROBILINOGEN UR-MCNC: NEGATIVE MG/DL (ref ?–2)
WBC # BLD AUTO: 8.4 10^3/UL (ref 4–10.5)

## 2019-03-21 PROCEDURE — 86850 RBC ANTIBODY SCREEN: CPT

## 2019-03-21 PROCEDURE — 10907ZC DRAINAGE OF AMNIOTIC FLUID, THERAPEUTIC FROM PRODUCTS OF CONCEPTION, VIA NATURAL OR ARTIFICIAL OPENING: ICD-10-PCS | Performed by: OBSTETRICS & GYNECOLOGY

## 2019-03-21 PROCEDURE — 86901 BLOOD TYPING SEROLOGIC RH(D): CPT

## 2019-03-21 PROCEDURE — 86592 SYPHILIS TEST NON-TREP QUAL: CPT

## 2019-03-21 PROCEDURE — 85027 COMPLETE CBC AUTOMATED: CPT

## 2019-03-21 PROCEDURE — 85025 COMPLETE CBC W/AUTO DIFF WBC: CPT

## 2019-03-21 PROCEDURE — 81005 URINALYSIS: CPT

## 2019-03-21 PROCEDURE — 86900 BLOOD TYPING SEROLOGIC ABO: CPT

## 2019-03-21 PROCEDURE — 94760 N-INVAS EAR/PLS OXIMETRY 1: CPT

## 2019-03-21 PROCEDURE — 36415 COLL VENOUS BLD VENIPUNCTURE: CPT

## 2019-03-21 PROCEDURE — 80307 DRUG TEST PRSMV CHEM ANLYZR: CPT

## 2019-03-21 RX ADMIN — FAMOTIDINE SCH: 20 TABLET, FILM COATED ORAL at 21:51

## 2019-03-21 RX ADMIN — IBUPROFEN SCH MG: 800 TABLET, FILM COATED ORAL at 21:03

## 2019-03-21 NOTE — DELIVERY SUMMARY
=================================================================

[facility] A-C

=================================================================

Datetime Report Generated by CPN: 2019 21:00

   

   

=================================================================

DELIVERY PERSONNEL

=================================================================

   

DELIVERY PERSONNEL:  [id number]

Delivery Doctor::  Santos Whaley MD

Labor and Delivery Nurse::  Marisela Allen RN

Labor and Delivery Nurse::  Ashley Casanova RN

Student Observers::  ALBANIA Sanchez RN

Additional Personnel: :  Faith Coats RN

   

=================================================================

MATERNAL INFORMATION

=================================================================

   

Delivery Anesthesia:  Epidural

Medications After Delivery:  Pitocin Bolus-Please Comment; Pitocin Drip

   20 Units/1000ml NSS

Meds After Delivery Comment:  Pitocin 20 units in 1 L NS bolusing per

   order

Maternal Complications:  None

   

=================================================================

LABOR SUMMARY

=================================================================

   

EDC:  2019 00:00

No. Babies in Womb:  1

 Attempted:  No

Labor Anesthesia:  Epidural

   

=================================================================

LABOR INFORMATION

=================================================================

   

Reason for Induction:  Pre-Eclampsia

Onset of Labor:  2019 15:22

Complete Dilatation:  2019 18:31

Cervical Ripening Agents:  Cervidil

Oxytocin:  Induction

Group B Beta Strep:  negative

Antibiotics # of Doses:  0

Antibiotics Time of Last Dose:  n/a

Name of Antibiotic Given:  n/a

Steroids Given:  None

Reason Steroids Not Administered:  Not Applicable

   

=================================================================

MEMBRANES

=================================================================

   

Membranes Rupture Method:  Artificial

Rupture of Membranes:  2019 13:50

Length of Rupture (hr):  5.05

Amniotic Fluid Color:  Clear

Amniotic Fluid Amount:  Moderate

Amniotic Fluid Odor:  Normal

   

=================================================================

STAGES OF LABOR

=================================================================

   

Stage 1 hr:  3

Stage 1 min:  9

Stage 2 hr:  0

Stage 2 min:  22

Stage 3 hr:  0

Stage 3 min:  3

Total Time in Labor hr:  3

Total Time in Labor min:  34

   

=================================================================

VAGINAL DELIVERY

=================================================================

   

Episiotomy:  None

Laceration #1:  None

Laceration Extension #1:  N/A

Laceration Repair:  Not Applicable

   

=================================================================

CSECTION DELIVERY

=================================================================

   

Primary Indication:  N/A

Secondary Indication:  N/A

CSection Incidence:  N/A

Labor:  N/A

Elective:  N/A

CSection Incision:  N/A

   

=================================================================

BABY A INFORMATION

=================================================================

   

Infant Delivery Date/Time:  2019 18:53

Method of Delivery:  Vaginal

Born in Route :  No

:  N/A

Forceps:  N/A

Vacuum Extraction:  N/A

Shoulder Dystocia :  No

   

=================================================================

PRESENTATION/POSITION BABY A

=================================================================

   

Presentation:  Cephalic

Cephalic Presentation:  Vertex

Vertex Position:  Left Occipital Anterior

Breech Presentation:  N/A

   

=================================================================

PLACENTA INFORMATION BABY A

=================================================================

   

Placenta Delivery Time :  2019 18:56

Placenta Method of Delivery:  Spontaneous

Placenta Status:  Delivered

   

=================================================================

APGAR SCORES BABY A

=================================================================

   

Heart Rate 1 min:  >100 bpm

Resp Effort 1 min:  Good Cry

Reflex Irritability 1 min:  Cough or Sneeze or Pulls Away

Muscle Tone 1 min:  Active Motion

Color 1 min:  Blue/Pale

Resuscitation Effort 1 min:  Tactile Stimulation

APGAR SCORE 1 MIN:  8

Heart Rate 5 min:  >100 bpm

Resp Effort 5 min:  Good Cry

Reflex Irritability 5 min:  Cough or Sneeze or Pulls Away

Muscle Tone 5 min:  Active Motion

Color 5 min:  Body Pink, Extremities Blue

APGAR SCORE 5 MIN:  9

   

=================================================================

INFANT INFORMATION BABY A

=================================================================

   

Gestational Age at Delivery:  39.5

Gestational Status:  Full Term- 39- 40.6 Weeks

Infant Outcome :  Liveborn

Infant Condition :  Stable

Infant Sex:  Female

   

=================================================================

IDENTIFICATION BABY A

=================================================================

   

Infant Verification Date/Time:  2019 19:56

ID Band Number:  [id number]

Mother's Name Verified:  Yes

Infant Medical Record Number:  [id number]

RN Verifying Infant:  Killinger

Additional Verifying Personnel:  Gage

   

=================================================================

WEIGHT/LENGTH BABY A

=================================================================

   

Infant Birthweight (gm):  2929

Infant Weight (lb):  6

Infant Weight (oz):  7

Infant Length (in):  19.00

Infant Length (cm):  48.26

   

=================================================================

CORD INFORMATION BABY A

=================================================================

   

No. Cord Vessels:  3

Nuchal Cord :  Around Neck x2, Loose

Cord Blood Taken:  Yes-For Storage (Mom's Blood type +)

Infant Suction:  None

   

=================================================================

ASSESSMENT BABY A

=================================================================

   

Infant Complications:  None

Physical Findings at Delivery:  Within Normal Limits

Physical Findings- Other:  nuchal cord x 1

Infant Respirations:  Appears Normal

Skin to Skin:  Yes

Transferred To:  Remains with Mother

   

=================================================================

BABY B INFORMATION

=================================================================

   

 :  N/A

   

=================================================================

SIGNATURES

=================================================================

   

Signature:  Electronically signed by Santos Whaley, MD ([email or web address]) on

   3/21/2019 at 18:59  with User ID: [email or web address]

## 2019-03-21 NOTE — ADMISSION PHYSICAL
=================================================================



=================================================================

Datetime Report Generated by CPN: 2019 07:10

   

   

=================================================================

CURRENT ADMISSION

=================================================================

   

Chief Complaint:  Scheduled Induction of Labor

Indication for Induction:  PreEclampsia

Admit Impression :  Term, Intrauterine Pregnancy; Intact Membranes;

   Induction of Labor

Admit Plan:  Admit to Unit; Initiate Labor Induction Protocol

   

=================================================================

ALLERGIES

=================================================================

   

Medication Allergies:  No

Medication Allergies:  No Known Allergies (2019)

Latex:  No Latex Allergies

Food Allergies:  None

Environmental Allergies:  Bees

   

=================================================================

OBSTETRICAL HISTORY

=================================================================

   

EDC:  2019 00:00

:  4

Para:  2

Term:  2

IAB:  1

Ectopic:  0

Livin

Cesareans:  0

VBACs:  0

Multiple Births:  0

Gestational Diabetes:  No

Rh Sensitization:  No

Incompetent Cervix:  No

MARVIN:  No

Infertility:  Yes

ART Treatment:  No

Uterine Anomaly:  No

IUGR:  No

Hx Previous C/S:  No

Macrosomia:  No

Hx Loss/Stillborn:  No

PIH:  No

Hx  Death:  No

Placenta Previa/Abruption:  No

Depression/PP Depression:  Yes

PTL/PROM:  No

Post Partum Hemorrhage:  No

Current Pregnancy Procedures:  Ultrasound

Obstetrical History Comments:  PCOS 

   G1-  IOL, postdates, PP depression

   G2-  IOL, Pre-E, PP depression

   G3- ,IAB

   G4- Current

      

   

=================================================================

***SEE PRENATAL RECORDS***

=================================================================

   

Alcohol:  No

Marijuana :  No

Cocaine:  No

Other Illicit Drugs:  No

Cigarettes:  Never Smoker. [id number]

   

=================================================================

MEDICAL HISTORY

=================================================================

   

Diabetes:  No

Blood Transfusion:  Yes

Pulmonary Disease (Asthma, TB):  No

Breast Disease:  No

Hypertension:  No

Gyn Surgery:  No

Heart Disease:  No

Hosp/Surgery:  Yes

Autoimmune Disorder:  No

Anesthetic Complications:  No

Kidney Disease:  No

Abnormal Pap Smear:  No

Neuro/Epilepsy:  No

Psychiatric Disorders:  No

Other Medical Diseases:  No

Hepatitis/Liver Disease:  No

Significant Family History:  No

Varicosities/Phlebitis:  No

Trauma/Violence :  No

Thyroid Dysfunction:  No

Medical History Comments:  SAD and PP depression for both deliveries.

   Blood transusion  low blood count. Hospitalizion for child birth.

   

=================================================================

INFECTIOUS HISTORY

=================================================================

   

Gonorrhea:  No

Genital Herpes:  No

Chlamydia:  Yes

Tuberculosis:  No

Syphilis:  No

Hepatitis:  No

HIV/AIDS Exposure:  No

Rash or Viral Illness:  No

HPV:  No

Infectious History Comments:   chlamydia

   

=================================================================

PHYSICAL EXAM

=================================================================

   

General:  Normal

HEENT:  Normal

Neurologic:  Normal

Thyroid:  Normal

Heart:  Normal

Lungs:  Normal

Breast:  Deferred

Back:  Normal

Abdomen:  Normal

Genitourinary Exam:  Normal

Extremities:  Normal

DTRs:  Normal

Pelvic Type:  Adequate

Vital Signs:  Reviewed

   

=================================================================

VAGINAL EXAM

=================================================================

   

Dilatation:  1

Effacement:  50

Station:  -2

   

=================================================================

MEMBRANES

=================================================================

   

Pooling:  Negative

Membranes:  Intact

   

=================================================================

FETUS A

=================================================================

   

EGA:  36.4

Monitoring:  External US

FHR- Baseline:  120

Variability:  Moderate 6-25bpm

Accelerations:  15X15

Decelerations:  None

FHR Category:  Category I

Fetal Presentation:  Vertex

Admit Comment:  efw 7-8 lbs

   

=================================================================

PLANS FOR LABOR AND DELIVERY

=================================================================

   

Labor and Delivery:  Other, Specify

Pain Management:  Epidural

Feeding Preference:  Formula

Benefit of Breast Feed Discussed:  Yes

Circumcision:  N/A

   

=================================================================

INFORMED CONSENT

=================================================================

   

Signature:  Electronically signed by MD Jen HollidaySMIDA) on

   3/21/2019 at 07:10  with User ID: [email or web address]

## 2019-03-22 LAB
ERYTHROCYTE [DISTWIDTH] IN BLOOD BY AUTOMATED COUNT: 15.4 % (ref 11.5–14)
HCT VFR BLD CALC: 24.7 % (ref 36–47)
HGB BLD-MCNC: 8.5 G/DL (ref 12–15.5)
MCH RBC QN AUTO: 25.8 PG (ref 27–33.4)
MCHC RBC AUTO-ENTMCNC: 34.4 G/DL (ref 32–36)
MCV RBC AUTO: 75 FL (ref 80–97)
PLATELET # BLD: 162 10^3/UL (ref 150–450)
RBC # BLD AUTO: 3.3 10^6/UL (ref 3.72–5.28)
WBC # BLD AUTO: 10.8 10^3/UL (ref 4–10.5)

## 2019-03-22 RX ADMIN — FERROUS SULFATE TAB 325 MG (65 MG ELEMENTAL FE) SCH MG: 325 (65 FE) TAB at 11:17

## 2019-03-22 RX ADMIN — DOCUSATE SODIUM SCH MG: 100 CAPSULE, LIQUID FILLED ORAL at 11:17

## 2019-03-22 RX ADMIN — FAMOTIDINE SCH: 20 TABLET, FILM COATED ORAL at 22:14

## 2019-03-22 RX ADMIN — IBUPROFEN SCH MG: 800 TABLET, FILM COATED ORAL at 05:10

## 2019-03-22 RX ADMIN — FERROUS SULFATE TAB 325 MG (65 MG ELEMENTAL FE) SCH MG: 325 (65 FE) TAB at 17:22

## 2019-03-22 RX ADMIN — IBUPROFEN SCH MG: 800 TABLET, FILM COATED ORAL at 17:23

## 2019-03-22 RX ADMIN — DOCUSATE SODIUM SCH MG: 100 CAPSULE, LIQUID FILLED ORAL at 17:22

## 2019-03-22 RX ADMIN — SENNOSIDES, DOCUSATE SODIUM SCH EACH: 50; 8.6 TABLET, FILM COATED ORAL at 11:16

## 2019-03-22 RX ADMIN — Medication SCH CAP: at 11:16

## 2019-03-22 RX ADMIN — FAMOTIDINE SCH MG: 20 TABLET, FILM COATED ORAL at 11:17

## 2019-03-22 RX ADMIN — IBUPROFEN SCH: 800 TABLET, FILM COATED ORAL at 14:56

## 2019-03-22 NOTE — PDOC PROGRESS REPORT
Subjective-OB


Progress Note for:: 19


Subjective: 


Pt doing well, no concerns. She reports light bleeding, reg diet and voiding 

without difficulty.








Physical Exam (OB)


Vital Signs: 


                                        











Temp Pulse Resp BP Pulse Ox


 


 97.8 F   68   16   115/68   98 


 


 19 08:15  19 08:15  19 08:15  19 08:15  19 08:15








                                 Intake & Output











 19





 06:59 06:59 06:59


 


Intake Total  300 


 


Balance  300 


 


Weight 87.7 kg  














- Lochia


Lochia Amount: Scant < 10 ml


Lochia Color: Rubra/Red





- Abdomen


Description: Soft


Hernia Present: No


Fundal Description: Firm


Fundal Height: u/u - u/2





Objective-Diagnostic


Laboratory: 


                                        





                                 19 06:50 





                                        











  19





  06:50


 


WBC  10.8 H


 


RBC  3.30 L


 


Hgb  8.5 L


 


Hct  24.7 L


 


MCV  75 L


 


MCH  25.8 L


 


MCHC  34.4


 


RDW  15.4 H


 


Plt Count  162














Assessment and Plan(PN)





- Assessment and Plan


(1)  (normal spontaneous vaginal delivery)


Is this a current diagnosis for this admission?: Yes   





(2) Pre-eclampsia


Qualifiers: 


   Trimester: third trimester   Qualified Code(s): O14.93 - Unspecified pre-

eclampsia, third trimester   


Is this a current diagnosis for this admission?: Yes   





- Time Spent with Patient


Time with patient: Less than 15 minutes


Medications reviewed and adjusted accordingly: Yes





- Disposition


Anticipated Discharge: Home


Within: within 24 hours

## 2019-03-23 VITALS — DIASTOLIC BLOOD PRESSURE: 58 MMHG | SYSTOLIC BLOOD PRESSURE: 112 MMHG

## 2019-03-23 RX ADMIN — SENNOSIDES, DOCUSATE SODIUM SCH EACH: 50; 8.6 TABLET, FILM COATED ORAL at 10:14

## 2019-03-23 RX ADMIN — IBUPROFEN SCH MG: 800 TABLET, FILM COATED ORAL at 02:13

## 2019-03-23 RX ADMIN — Medication SCH CAP: at 10:14

## 2019-03-23 RX ADMIN — FERROUS SULFATE TAB 325 MG (65 MG ELEMENTAL FE) SCH MG: 325 (65 FE) TAB at 10:13

## 2019-03-23 RX ADMIN — FAMOTIDINE SCH MG: 20 TABLET, FILM COATED ORAL at 10:13

## 2019-03-23 RX ADMIN — IBUPROFEN SCH MG: 800 TABLET, FILM COATED ORAL at 10:14

## 2019-03-23 RX ADMIN — DOCUSATE SODIUM SCH MG: 100 CAPSULE, LIQUID FILLED ORAL at 10:13

## 2019-03-23 NOTE — PDOC DISCHARGE SUMMARY
Final Diagnosis


Discharge Date: 19





- Final Diagnosis


(1)  (normal spontaneous vaginal delivery)


Is this a current diagnosis for this admission?: Yes   





(2) Pre-eclampsia


Is this a current diagnosis for this admission?: Yes   





Discharge Data





- Discharge Medication


Home Medications: 








Pnv,Calcium 72/Iron/Folic Acid [Prenatal Plus Tablet] 1 each PO DAILY 19 


Ranitidine HCl [Zantac 150 mg Tablet] 150 mg PO BID 19 


Docusate Sodium [Colace] 100 mg PO DAILY 19 








Reason(s) for Admission: Induction of Labor


Prenatal Procedures: NST


Intrapartum Procedure(s): Spontaneous Vaginal Delivery





- Diagnosis Test


Laboratory: 


                                        











Temp Pulse Resp BP Pulse Ox


 


 97.7 F   73   16   127/74 H  100 


 


 19 21:06  19 21:06  19 21:06  19 21:06  19 21:06








                                        











  19





  03:30 03:50 06:50


 


RBC   3.69 L  3.30 L


 


Hgb   9.4 L  8.5 L


 


Hct   27.5 L  24.7 L


 


Urine Opiates Screen  NEGATIVE  














- Discharge information/Instructions


Discharge Activity: Balance Activity w/Rest, Pelvic Rest


Discharge Diet: Regular


Disposition: HOME, SELF-CARE


Follow up with: [facility]


in: 4, Weeks

## 2019-03-26 ENCOUNTER — HOSPITAL ENCOUNTER (EMERGENCY)
Dept: HOSPITAL 62 - ER | Age: 24
Discharge: HOME | End: 2019-03-26
Payer: MEDICAID

## 2019-03-26 VITALS — SYSTOLIC BLOOD PRESSURE: 146 MMHG | DIASTOLIC BLOOD PRESSURE: 87 MMHG

## 2019-03-26 DIAGNOSIS — R10.9: ICD-10-CM

## 2019-03-26 DIAGNOSIS — R19.7: ICD-10-CM

## 2019-03-26 DIAGNOSIS — R11.10: ICD-10-CM

## 2019-03-26 DIAGNOSIS — K62.89: Primary | ICD-10-CM

## 2019-03-26 LAB
ADD MANUAL DIFF: NO
ALBUMIN SERPL-MCNC: 3.5 G/DL (ref 3.5–5)
ALP SERPL-CCNC: 125 U/L (ref 38–126)
ALT SERPL-CCNC: 18 U/L (ref 9–52)
ANION GAP SERPL CALC-SCNC: 11 MMOL/L (ref 5–19)
APPEARANCE UR: CLEAR
APTT PPP: YELLOW S
AST SERPL-CCNC: 26 U/L (ref 14–36)
BASOPHILS # BLD AUTO: 0 10^3/UL (ref 0–0.2)
BASOPHILS NFR BLD AUTO: 0.3 % (ref 0–2)
BILIRUB DIRECT SERPL-MCNC: 0.3 MG/DL (ref 0–0.4)
BILIRUB SERPL-MCNC: 0.4 MG/DL (ref 0.2–1.3)
BILIRUB UR QL STRIP: NEGATIVE
BUN SERPL-MCNC: 15 MG/DL (ref 7–20)
CALCIUM: 9.7 MG/DL (ref 8.4–10.2)
CHLORIDE SERPL-SCNC: 107 MMOL/L (ref 98–107)
CO2 SERPL-SCNC: 19 MMOL/L (ref 22–30)
EOSINOPHIL # BLD AUTO: 0.2 10^3/UL (ref 0–0.6)
EOSINOPHIL NFR BLD AUTO: 1.8 % (ref 0–6)
ERYTHROCYTE [DISTWIDTH] IN BLOOD BY AUTOMATED COUNT: 15.7 % (ref 11.5–14)
GLUCOSE SERPL-MCNC: 90 MG/DL (ref 75–110)
GLUCOSE UR STRIP-MCNC: NEGATIVE MG/DL
HCT VFR BLD CALC: 32.9 % (ref 36–47)
HGB BLD-MCNC: 11 G/DL (ref 12–15.5)
KETONES UR STRIP-MCNC: NEGATIVE MG/DL
LIPASE SERPL-CCNC: 51.8 U/L (ref 23–300)
LYMPHOCYTES # BLD AUTO: 1.4 10^3/UL (ref 0.5–4.7)
LYMPHOCYTES NFR BLD AUTO: 14.6 % (ref 13–45)
MCH RBC QN AUTO: 25.4 PG (ref 27–33.4)
MCHC RBC AUTO-ENTMCNC: 33.4 G/DL (ref 32–36)
MCV RBC AUTO: 76 FL (ref 80–97)
MONOCYTES # BLD AUTO: 0.6 10^3/UL (ref 0.1–1.4)
MONOCYTES NFR BLD AUTO: 5.9 % (ref 3–13)
NEUTROPHILS # BLD AUTO: 7.7 10^3/UL (ref 1.7–8.2)
NEUTS SEG NFR BLD AUTO: 77.4 % (ref 42–78)
NITRITE UR QL STRIP: NEGATIVE
PH UR STRIP: 5 [PH] (ref 5–9)
PLATELET # BLD: 310 10^3/UL (ref 150–450)
POTASSIUM SERPL-SCNC: 4 MMOL/L (ref 3.6–5)
PROT SERPL-MCNC: 6.6 G/DL (ref 6.3–8.2)
PROT UR STRIP-MCNC: NEGATIVE MG/DL
RBC # BLD AUTO: 4.34 10^6/UL (ref 3.72–5.28)
SODIUM SERPL-SCNC: 137.4 MMOL/L (ref 137–145)
SP GR UR STRIP: > 1.06
TOTAL CELLS COUNTED % (AUTO): 100 %
UROBILINOGEN UR-MCNC: NEGATIVE MG/DL (ref ?–2)
WBC # BLD AUTO: 9.9 10^3/UL (ref 4–10.5)

## 2019-03-26 PROCEDURE — 87086 URINE CULTURE/COLONY COUNT: CPT

## 2019-03-26 PROCEDURE — 81001 URINALYSIS AUTO W/SCOPE: CPT

## 2019-03-26 PROCEDURE — 74177 CT ABD & PELVIS W/CONTRAST: CPT

## 2019-03-26 PROCEDURE — 99284 EMERGENCY DEPT VISIT MOD MDM: CPT

## 2019-03-26 PROCEDURE — 36415 COLL VENOUS BLD VENIPUNCTURE: CPT

## 2019-03-26 PROCEDURE — 72146 MRI CHEST SPINE W/O DYE: CPT

## 2019-03-26 PROCEDURE — 72141 MRI NECK SPINE W/O DYE: CPT

## 2019-03-26 PROCEDURE — 80053 COMPREHEN METABOLIC PANEL: CPT

## 2019-03-26 PROCEDURE — 85025 COMPLETE CBC W/AUTO DIFF WBC: CPT

## 2019-03-26 PROCEDURE — 72158 MRI LUMBAR SPINE W/O & W/DYE: CPT

## 2019-03-26 PROCEDURE — 96374 THER/PROPH/DIAG INJ IV PUSH: CPT

## 2019-03-26 PROCEDURE — A9576 INJ PROHANCE MULTIPACK: HCPCS

## 2019-03-26 PROCEDURE — 96361 HYDRATE IV INFUSION ADD-ON: CPT

## 2019-03-26 PROCEDURE — 83690 ASSAY OF LIPASE: CPT

## 2019-03-26 NOTE — RADIOLOGY REPORT (SQ)
EXAM DESCRIPTION:  MRI THORACIC SPINE WITHOUT



COMPLETED DATE/TIME:  3/26/2019 2:09 pm



REASON FOR STUDY:  loss of bowel function post delivery 4 days



COMPARISON:  None.



TECHNIQUE:  Sagittal and Axial imaging includes T1, T2, STIR and gradient echo sequences.



LIMITATIONS:  None.



FINDINGS:  LOCALIZER: No worrisome findings.

ALIGNMENT: Normal.

VERTEBRAE: Intact.

BONE MARROW: Normal. No marrow replacement or reactive changes.

HARDWARE: None in the spine.

CORD: Normal in size and signal intensity.

SOFT TISSUES: No soft tissue masses.

THORACIC DISCS T1-T12: There is annular disc bulging at T7-T8, T8-T9 and a focal protrusion at T10-T1
1 which is previously described.  Remainder of the disc space levels are unremarkable.  Mild effaceme
nt anterior thecal sac at T7-T8 and T8-T9.  No significant mass effect.  The focal protrusion at T10-
T11 indents the right anterolateral aspect of the sac no high-grade cord impingement.

LOWER CERVICAL: Incompletely imaged. No significant spinal stenosis or exit foraminal stenosis.

UPPER LUMBAR: Incompletely imaged.  No significant spinal stenosis or exit foraminal stenosis.

OTHER: No other significant finding.



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
-T11.  No high-grade central stenosis.  No infectious or inflammatory changes.



TECHNICAL DOCUMENTATION:  JOB ID:  [id number]

 2011 [employer]- All Rights Reserved



Reading location - IP/workstation name: [email or web address]

## 2019-03-26 NOTE — RADIOLOGY REPORT (SQ)
EXAM DESCRIPTION:

CT ABDOMEN PELVIS WITH IV CONTRAST



COMPLETED DATE/TME:  03/26/2019 04:11



CLINICAL HISTORY:

24 years Female, periumbilical pain, LOSS OF RECTAL TONE,

CHILDBIRTH [birth year]



Comparison: None.



Technique:  IV contrast.  Coronal and sagittal reformat.  This

exam was performed according to our departmental

dose-optimization program, which includes automated exposure

control, adjustment of the mA and/or kV according to patient size

and/or use of iterative reconstruction technique.  [facility]: Dose

Right [facility]: CareDose   [facility]: Dose Right    [facility]: Teradose 4D   

[facility]: [employer] Technologies



LIMITATIONS:

None



Findings: 

Splenic index is 642.

Post gravid enlarged uterus. Retained colonic fluid.

No ascites.  No evidence of appendicitis.  Appendix not

definitively discerned.  No gross evidence of gallbladder

inflammation or hepatobiliary obstruction.  No hydronephrosis or

hydroureter.  No renal/ureteral stone.  No bowel obstruction.  No

evidence of abdominal aortic aneurysm.  Inferior thorax, liver,

gallbladder, pancreas, spleen, adrenals, renal system,

gastrointestinal tract, pelvic organs, lymphatics, vasculature,

and musculoskeleton appear otherwise unremarkable.   



IMPRESSION:



1.  Mild splenomegaly.

2.  Postgravid enlarged uterus.

## 2019-03-26 NOTE — RADIOLOGY REPORT (SQ)
EXAM DESCRIPTION:  MRI LUMBAR SPINE COMBO



COMPLETED DATE/TIME:  3/26/2019 8:44 am



REASON FOR STUDY:  loss of rectal tone s/p epidural



COMPARISON:  None.



TECHNIQUE:  Sagittal and Axial imaging includes T1, T1 post gadolinium, T2, STIR and gradient echo se
quences. Coronal T2/HASTE imaging.



CONTRAST TYPE AND DOSE:  15 mL Dotarem.



RENAL FUNCTION:  Not indicated.  ACR Type II contrast agent associated with few, if any, unconfounded
 cases of NSF



LIMITATIONS:  None.



FINDINGS:  VISUALIZED UPPER ABDOMEN:  Limited evaluation. No acute or suspicious findings suggested.

SEGMENTATION: No transitional anatomy. The lowest well-developed disc space is labeled L5-S1.

ALIGNMENT: Anatomic.

VERTEBRAE: Intact.  No fractures.

BONE MARROW: Normal. No marrow replacement or reactive changes.

DISC SIGNAL: Normal. No significant abnormal signal or loss of height.

POSTERIOR ELEMENTS:  Generally intact.  No pars defect evident.

HARDWARE: None in the spine.

CORD AND CONUS: Normal in size and signal intensity. Conus at the appropriate level.

SOFT TISSUES: No aortic aneurysm seen. No bulky retroperitoneal adenopathy or mass. No paraspinal mas
s or fluid.

L1-L2: No significant spinal stenosis or exit foraminal stenosis.

L2-L3: No significant spinal stenosis or exit foraminal stenosis.

L3-L4: No significant spinal stenosis or exit foraminal stenosis.

L4-L5: No significant spinal stenosis or exit foraminal stenosis.

L5-S1: No significant spinal stenosis or exit foraminal stenosis.

LOWER THORACIC: There is a superiorly directed disc extrusion at T10-11 which is seen only in sagitta
l sequences and appears to contact the left aspect of the thoracic spinal cord (series 4, image 9).  
The fragment measures approximately 6 mm in sagittal plane.  There is no obvious signal abnormality.

SACRUM: Visualized upper sacrum intact.

ENHANCEMENT: No abnormal enhancement.

OTHER: No other significant findings.



IMPRESSION:  1. There is a superiorly directed disc extrusion at T10-11 which is seen only in sagitta
l sequences and appears to contact the left aspect of the thoracic spinal cord.  The fragment measure
s approximately 6 mm in sagittal plane.  There is no obvious signal abnormality of the included thora
cic spinal cord. Finding is of uncertain acute clinical significance and can be further evaluated by 
dedicated MR examination of the thoracic spine.

2.  No significant MR abnormality of the lumbar spine.



TECHNICAL DOCUMENTATION:  JOB ID:  [id number]

 2011 [employer]- All Rights Reserved



Reading location - IP/workstation name: [email or web address]

## 2019-03-26 NOTE — ER DOCUMENT REPORT
ED General





- General


TRAVEL OUTSIDE OF THE [place] IN LAST 30 DAYS: No





<ZECHARIAH ARCEO - Last Filed: 03/26/19 08:24>





<JAVI BOBBY - Last Filed: 03/26/19 20:15>





- General


Chief Complaint: Abdominal Pain


Stated Complaint: ABDOMINAL PAIN


Time Seen by Provider: 03/26/19 03:37


Primary Care Provider: 


SUSAN RAMACHANDRAN MD [ACTIVE STAFF] - Follow up as needed


Notes: 





Patient is a 24-year-old female presents to the emergency department with a 

generalized abdominal pain.  Patient is 5 days status post vaginal delivery with

an epidural.  States delivery was uncomplicated. 


Patient states this evening she woke up with sudden periumbilical abdominal pain

and vomiting.  States she vomited so violently that she had diarrhea.  Patient 

states after her multiple episodes of vomiting and diarrhea EMS was alerted.


Patient states when she was in the ambulance she continued with diarrhea but 

states she was unable to feel as though she had to go to the bathroom.  States 

she was sitting there and just felt that her buttocks and legs were wet.  

Patient states now she feels as though the diarrhea is "running out of me."


Patient denies any numbness or tingling in any extremity, lower back pain.


Patient was given Zofran via EMS and states she is no longer nauseated.





Past medical history: None


Medications: None


Allergies: None (ZECHARIAH ARCEO)





- Related Data


Allergies/Adverse Reactions: 


                                        





No Known Allergies Allergy (Verified 03/26/19 05:34)


   











Past Medical History





- General


Information source: Patient





- Social History


Smoking Status: Never Smoker


Frequency of alcohol use: None


Drug Abuse: None


Family History: Reviewed & Not Pertinent


Patient has suicidal ideation: No


Patient has homicidal ideation: No


Renal/ Medical History: Denies: Hx Peritoneal Dialysis





- Immunizations


Immunizations up to date: Yes





<ZECHARIAH ARCEO - Last Filed: 03/26/19 08:24>





Review of Systems





- Review of Systems


Constitutional: No symptoms reported


EENT: No symptoms reported


Cardiovascular: No symptoms reported


Respiratory: No symptoms reported


Gastrointestinal: See HPI


Genitourinary: No symptoms reported


Female Genitourinary: See HPI


Musculoskeletal: No symptoms reported


Skin: No symptoms reported


Hematologic/Lymphatic: No symptoms reported


Neurological/Psychological: No symptoms reported





<ZECHARIAH ARCEO - Last Filed: 03/26/19 08:24>





Physical Exam





<ZECHARIAH ARCEO - Last Filed: 03/26/19 08:24>





- Vital signs


Vitals: 


                                        











Temp Pulse Resp BP Pulse Ox


 


 97.8 F   70   24 H  127/82 H  100 


 


 03/26/19 03:14  03/26/19 03:14  03/26/19 03:14  03/26/19 03:14  03/26/19 03:14














- Notes


Notes: 





GENERAL: Alert, interacts well. No acute distress.


HEAD: Normocephalic, atraumatic.


EYES: Pupils equal, round, and reactive to light. Extraocular movements intact.


ENT: Oral mucosa moist, tongue midline. 


NECK: Full range of motion. Supple. Trachea midline.


LUNGS: Clear to auscultation bilaterally, no wheezes, rales, or rhonchi. No 

respiratory distress.


HEART: Regular rate and rhythm. No murmur


ABDOMEN: Soft. Non-distended. Bowel sounds present in all 4 quadrants.  

Generalized periumbilical abdominal pain noted.


EXTREMITIES: Moves all 4 extremities spontaneously. No edema, normal radial and 

dorsalis pedis pulses bilaterally. No cyanosis.  5 out of 5 strength all 4 

extremities.


BACK: no cervical, thoracic, lumbar midline tenderness. No saddle anesthesia, 

normal distal neurovascular exam. 


NEUROLOGICAL: Alert and oriented x3. Normal speech. cranial nerves II through 

XII grossly intact 


PSYCH: Normal affect, normal mood.


SKIN: Warm, dry, normal turgor. No rashes or lesions noted.


Patient was noted to have no rectal tone on examination.


 (ZECHARIAH ARCEO)





Course





- Laboratory


Result Diagrams: 


                                 03/26/19 03:20





                                 03/26/19 03:20





<ZECHARIAH ARCEO - Last Filed: 03/26/19 08:24>





- Laboratory


Result Diagrams: 


                                 03/26/19 03:20





                                 03/26/19 03:20





<JAVI BOBBY - Last Filed: 03/26/19 20:15>





- Re-evaluation


Re-evalutation: 


Discussed this case at length with my attending Dr. Deleon.  Patient needs an 

MRI of her lumbar spine.  In the time it was take to transfer the patient to 

another facility and have her arrive at another facility for an MRI it would be 

around 7:00 when MRIs are available at this facility.


Discussed this at length with patient at bedside and she agrees to stay in the 

emergency department awaiting MRI.





Patient's labs show no signs of leukocytosis, no signs of electrolyte 

abnormalities.  Patient's CT abdomen pelvis showed minor splenomegaly, no other 

abnormalities.  On repeat examination patient states her periumbilical abdominal

pain is "a lot better."





Continues to state that she is unable to feel when she has to go to the 

bathroom.  States intermittently she feels as though her buttocks and lower legs

become wet and noticed that she has soiled herself with feces.








03/26/19 08:29


Patient care and report given to Javi CHRISTIAN PA-C for continued care.  Awaiting MRI

results. (ZECHARIAH ARCEO)





03/26/19 10:20


MRI of the lumbar spine combo reveals a superiorly directed disc extrusion at 

T10-11 with contact of the left aspect of the thoracic spinal cord, 6 mm in the 

sagittal plane.  Neuro at [facility] called at this time for probable transfer.

 Patient has 5 out of 5 motor strength and no deficits other than rectal tone 

appreciated today.


03/26/19 10:51


Dr. Severino, neurosurgeon at [facility] reviewed MRI of the lumbar spine and 

reveals that he does not think this is the cause of her loss of bowel function, 

once MRI of the thoracic and cervical spine at this time to further evaluate her

symptoms.


03/26/19 15:23


Dr. Severino, neurosurgeon at [facility] just call back, MRI of the thoracic spine 

was power shared to him and he has read it, patient has some disc bulging at T7-

T8 and T8-T9 and a focal protrusion at T10-11, however no central stenosis, Dr. Severino states that the disc protrusion is contacting the cord but not causing her 

symptoms, he states there is nothing operative or acute on MRI today, advises 

outpatient follow-up.  Patient is very reasonable and happy with this plan. 

(JAVI BOBBY)





- Vital Signs


Vital signs: 


                                        











Temp Pulse Resp BP Pulse Ox


 


 97.8 F   88   16   146/87 H  100 


 


 03/26/19 03:14  03/26/19 15:36  03/26/19 15:36  03/26/19 15:38  03/26/19 15:36














- Laboratory


Laboratory results interpreted by me: 


                                        











  03/26/19 03/26/19 03/26/19





  03:20 03:20 09:25


 


Hgb  11.0 L  


 


Hct  32.9 L  


 


MCV  76 L  


 


MCH  25.4 L  


 


RDW  15.7 H  


 


Carbon Dioxide   19 L 


 


Urine Blood    LARGE H


 


Ur Leukocyte Esterase    TRACE H














Discharge





<ZECHARIAH ARCEO - Last Filed: 03/26/19 08:24>





<DIAMANTEJAVI - Last Filed: 03/26/19 20:15>





- Discharge


Clinical Impression: 


 Decreased rectal sphincter tone





Diarrhea


Qualifiers:


 Diarrhea type: unspecified type Qualified Code(s): R19.7 - Diarrhea, unspecifi

ed





Condition: Stable


Disposition: HOME, SELF-CARE


Additional Instructions: 


Return immediately for any new or worsening symptoms.





Follow up with primary care provider, call tomorrow to make followup 

appointment.





Please follow-up with Dr. Severino, neurosurgeon with [facility]





[address]


[facility], [address] [phone]


Prescriptions: 


Methylprednisolone [Medrol Dosepack (4 mg/Tab) 21 Tab/Dosepak] 4 mg PO ASDIR PRN

#21 tab.ds.pk


 PRN Reason: 


Referrals: 


SUSAN RAMACHANDRAN MD [ACTIVE STAFF] - Follow up as needed

## 2019-03-26 NOTE — RADIOLOGY REPORT (SQ)
EXAM DESCRIPTION:  MRI CERVICAL SPINE WITHOUT



COMPLETED DATE/TIME:  3/26/2019 2:09 pm



REASON FOR STUDY:  loss of bowel function, neck pain 4 days



COMPARISON:  None.



TECHNIQUE:  Sagittal and Axial imaging includes T1, T2, STIR and gradient echo sequences.



LIMITATIONS:  None.



FINDINGS:  ALIGNMENT: There is straightening of the normal cervical lordosis.

VERTEBRAE: Intact.

BONE MARROW: Normal. No marrow replacement or reactive changes.

DISCS: Minimal loss of height and signal at C5-C6.

HARDWARE: None in the spine.

CORD AND BASE OF BRAIN: Normal in size and signal intensity.

SOFT TISSUES: No soft tissue masses.

C1-C2: No significant spinal stenosis.

C2-C3: No significant spinal stenosis or exit foraminal stenosis.

C3-C4: No significant spinal stenosis or exit foraminal stenosis.

C4-C5: No significant spinal stenosis or exit foraminal stenosis.

C5-C6: Slight indentation of the anterior thecal sac due to annular bulge.  No spinal stenosis or ner
ve root impingement.

C6-C7: No significant spinal stenosis or exit foraminal stenosis.

C7-T1: No significant spinal stenosis or exit foraminal stenosis.

UPPER THORACIC: Incompletely imaged. No significant spinal stenosis or exit foraminal stenosis.

OTHER: No other significant finding.



IMPRESSION:  Mild disc degenerative disease at C5-C6.  No spinal stenosis or nerve root impingement.



TECHNICAL DOCUMENTATION:  JOB ID:  [id number]

 [employer]- All Rights Reserved



Reading location - IP/workstation name: [email or web address]

## 2019-08-01 ENCOUNTER — HOSPITAL ENCOUNTER (OUTPATIENT)
Dept: HOSPITAL 62 - OD | Age: 24
End: 2019-08-01
Attending: NURSE PRACTITIONER
Payer: COMMERCIAL

## 2019-08-01 DIAGNOSIS — R10.9: Primary | ICD-10-CM

## 2019-08-01 DIAGNOSIS — R30.0: ICD-10-CM

## 2019-08-01 PROCEDURE — 87086 URINE CULTURE/COLONY COUNT: CPT

## 2019-10-08 ENCOUNTER — HOSPITAL ENCOUNTER (EMERGENCY)
Dept: HOSPITAL 62 - ER | Age: 24
Discharge: HOME | End: 2019-10-08
Payer: COMMERCIAL

## 2019-10-08 VITALS — SYSTOLIC BLOOD PRESSURE: 127 MMHG | DIASTOLIC BLOOD PRESSURE: 75 MMHG
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
## 2019-10-08 DIAGNOSIS — S43.402A: ICD-10-CM

## 2019-10-08 DIAGNOSIS — M54.5: ICD-10-CM

## 2019-10-08 DIAGNOSIS — M25.522: ICD-10-CM

## 2019-10-08 DIAGNOSIS — M25.552: ICD-10-CM

## 2019-10-08 DIAGNOSIS — S60.512A: ICD-10-CM

## 2019-10-08 DIAGNOSIS — M25.512: ICD-10-CM

## 2019-10-08 DIAGNOSIS — S29.012A: Primary | ICD-10-CM

## 2019-10-08 DIAGNOSIS — M79.602: ICD-10-CM

## 2019-10-08 PROCEDURE — 90471 IMMUNIZATION ADMIN: CPT

## 2019-10-08 PROCEDURE — 73080 X-RAY EXAM OF ELBOW: CPT

## 2019-10-08 PROCEDURE — L3650 SO 8 ABD RESTRAINT PRE OTS: HCPCS

## 2019-10-08 PROCEDURE — 90715 TDAP VACCINE 7 YRS/> IM: CPT

## 2019-10-08 PROCEDURE — 99283 EMERGENCY DEPT VISIT LOW MDM: CPT

## 2019-10-08 PROCEDURE — 72070 X-RAY EXAM THORAC SPINE 2VWS: CPT

## 2019-10-08 PROCEDURE — 73030 X-RAY EXAM OF SHOULDER: CPT

## 2019-10-08 PROCEDURE — 73502 X-RAY EXAM HIP UNI 2-3 VIEWS: CPT

## 2019-10-08 PROCEDURE — 81025 URINE PREGNANCY TEST: CPT
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
## 2020-09-09 ENCOUNTER — HOSPITAL ENCOUNTER (EMERGENCY)
Dept: HOSPITAL 62 - ER | Age: 25
LOS: 1 days | Discharge: ANOTHER HOSPITAL | End: 2020-09-10
Payer: COMMERCIAL

## 2020-09-09 DIAGNOSIS — R20.2: ICD-10-CM

## 2020-09-09 DIAGNOSIS — R53.1: ICD-10-CM

## 2020-09-09 DIAGNOSIS — N39.0: ICD-10-CM

## 2020-09-09 DIAGNOSIS — R20.0: Primary | ICD-10-CM

## 2020-09-09 DIAGNOSIS — Z88.0: ICD-10-CM

## 2020-09-09 LAB
ADD MANUAL DIFF: NO
ALBUMIN SERPL-MCNC: 4.2 G/DL (ref 3.5–5)
ALP SERPL-CCNC: 76 U/L (ref 38–126)
ANION GAP SERPL CALC-SCNC: 9 MMOL/L (ref 5–19)
APPEARANCE UR: (no result)
APTT PPP: YELLOW S
AST SERPL-CCNC: 18 U/L (ref 14–36)
BASOPHILS # BLD AUTO: 0.1 10^3/UL (ref 0–0.2)
BASOPHILS NFR BLD AUTO: 0.8 % (ref 0–2)
BILIRUB DIRECT SERPL-MCNC: 0.3 MG/DL (ref 0–0.4)
BILIRUB SERPL-MCNC: 0.5 MG/DL (ref 0.2–1.3)
BILIRUB UR QL STRIP: NEGATIVE
BUN SERPL-MCNC: 11 MG/DL (ref 7–20)
CALCIUM: 8.9 MG/DL (ref 8.4–10.2)
CHLORIDE SERPL-SCNC: 103 MMOL/L (ref 98–107)
CO2 SERPL-SCNC: 28 MMOL/L (ref 22–30)
EOSINOPHIL # BLD AUTO: 0.2 10^3/UL (ref 0–0.6)
EOSINOPHIL NFR BLD AUTO: 2.1 % (ref 0–6)
ERYTHROCYTE [DISTWIDTH] IN BLOOD BY AUTOMATED COUNT: 14.2 % (ref 11.5–14)
GLUCOSE SERPL-MCNC: 98 MG/DL (ref 75–110)
GLUCOSE UR STRIP-MCNC: NEGATIVE MG/DL
HCT VFR BLD CALC: 31.7 % (ref 36–47)
HGB BLD-MCNC: 10.9 G/DL (ref 12–15.5)
KETONES UR STRIP-MCNC: NEGATIVE MG/DL
LYMPHOCYTES # BLD AUTO: 2.1 10^3/UL (ref 0.5–4.7)
LYMPHOCYTES NFR BLD AUTO: 27.8 % (ref 13–45)
MCH RBC QN AUTO: 29 PG (ref 27–33.4)
MCHC RBC AUTO-ENTMCNC: 34.5 G/DL (ref 32–36)
MCV RBC AUTO: 84 FL (ref 80–97)
MONOCYTES # BLD AUTO: 0.4 10^3/UL (ref 0.1–1.4)
MONOCYTES NFR BLD AUTO: 5.4 % (ref 3–13)
NEUTROPHILS # BLD AUTO: 4.9 10^3/UL (ref 1.7–8.2)
NEUTS SEG NFR BLD AUTO: 63.9 % (ref 42–78)
NITRITE UR QL STRIP: POSITIVE
PH UR STRIP: 5 [PH] (ref 5–9)
PLATELET # BLD: 228 10^3/UL (ref 150–450)
POTASSIUM SERPL-SCNC: 3.7 MMOL/L (ref 3.6–5)
PROT SERPL-MCNC: 6.6 G/DL (ref 6.3–8.2)
PROT UR STRIP-MCNC: NEGATIVE MG/DL
RBC # BLD AUTO: 3.77 10^6/UL (ref 3.72–5.28)
SP GR UR STRIP: 1.02
TOTAL CELLS COUNTED % (AUTO): 100 %
UROBILINOGEN UR-MCNC: 2 MG/DL (ref ?–2)
WBC # BLD AUTO: 7.7 10^3/UL (ref 4–10.5)

## 2020-09-09 PROCEDURE — 84443 ASSAY THYROID STIM HORMONE: CPT

## 2020-09-09 PROCEDURE — 72070 X-RAY EXAM THORAC SPINE 2VWS: CPT

## 2020-09-09 PROCEDURE — 96374 THER/PROPH/DIAG INJ IV PUSH: CPT

## 2020-09-09 PROCEDURE — 82803 BLOOD GASES ANY COMBINATION: CPT

## 2020-09-09 PROCEDURE — 85652 RBC SED RATE AUTOMATED: CPT

## 2020-09-09 PROCEDURE — 96375 TX/PRO/DX INJ NEW DRUG ADDON: CPT

## 2020-09-09 PROCEDURE — 82550 ASSAY OF CK (CPK): CPT

## 2020-09-09 PROCEDURE — 96361 HYDRATE IV INFUSION ADD-ON: CPT

## 2020-09-09 PROCEDURE — 85025 COMPLETE CBC W/AUTO DIFF WBC: CPT

## 2020-09-09 PROCEDURE — 99285 EMERGENCY DEPT VISIT HI MDM: CPT

## 2020-09-09 PROCEDURE — 83735 ASSAY OF MAGNESIUM: CPT

## 2020-09-09 PROCEDURE — 72125 CT NECK SPINE W/O DYE: CPT

## 2020-09-09 PROCEDURE — 70450 CT HEAD/BRAIN W/O DYE: CPT

## 2020-09-09 PROCEDURE — 84100 ASSAY OF PHOSPHORUS: CPT

## 2020-09-09 PROCEDURE — 80053 COMPREHEN METABOLIC PANEL: CPT

## 2020-09-09 PROCEDURE — 82607 VITAMIN B-12: CPT

## 2020-09-09 PROCEDURE — 84703 CHORIONIC GONADOTROPIN ASSAY: CPT

## 2020-09-09 PROCEDURE — 81001 URINALYSIS AUTO W/SCOPE: CPT

## 2020-09-09 PROCEDURE — 36415 COLL VENOUS BLD VENIPUNCTURE: CPT

## 2020-09-09 NOTE — RADIOLOGY REPORT (SQ)
EXAM DESCRIPTION:  T SPINE AP/LAT



IMAGES COMPLETED DATE/TIME:  9/9/2020 6:01 pm



REASON FOR STUDY:  numbness both hands and feet hx ddd



COMPARISON:  None.



NUMBER OF VIEWS:  Two views.



TECHNIQUE:  AP and lateral radiographic images acquired of the thoracic spine.



LIMITATIONS:  None.



FINDINGS:  MINERALIZATION: Normal.

ALIGNMENT: Normal.  No scoliosis.

VERTEBRAE: No fracture or bone lesion.  Maintained height, normal segmentation.

DISCS: No significant loss of height or significant narrowing.  No large osteophytes.

HARDWARE: None in the spine.

MEDIASTINUM AND SOFT TISSUES: Normal heart size and aortic contour.  No soft tissue abnormality.

VISUALIZED LUNG FIELDS: Clear.

OTHER: No other significant finding.



IMPRESSION:  NO SIGNIFICANT RADIOGRAPHIC FINDING IN THE THORACIC SPINE.



TECHNICAL DOCUMENTATION:  JOB ID:  [id number]

 2011 [employer] All Rights Reserved



Reading location - IP/workstation name: [email or web address]

## 2020-09-09 NOTE — RADIOLOGY REPORT (SQ)
EXAM DESCRIPTION:  CT CERVICAL SPINE WITHOUT



IMAGES COMPLETED DATE/TIME:  9/9/2020 7:21 pm



REASON FOR STUDY:  numbness to both arms and legs



COMPARISON:  None.



TECHNIQUE:  Axial images acquired through the cervical spine without intravenous contrast.  Images re
viewed with lung, soft tissue and bone windows.  Reconstructed coronal and sagittal MPR images review
ed.  Images stored on PACS.

All CT scanners at this facility use dose modulation, iterative reconstruction, and/or weight based d
osing when appropriate to reduce radiation dose to as low as reasonably achievable (ALARA).

[facility]: Dose Right  [facility]: CareDose    [facility]: Dose Right    [facility]: Teradose 4D    [facility]: Smart StudyCloud



RADIATION DOSE:  CT Rad equipment meets quality standard of care and radiation dose reduction techniq
ues were employed. CTDIvol: 15.0 mGy. DLP: 294 mGy-cm. mGy.



LIMITATIONS:  None.



FINDINGS:  ALIGNMENT: Anatomic.

MINERALIZATION: Normal.

VERTEBRAL BODIES: No fractures or dislocation.

DISCS: No significant disc disease.

FACETS, LATERAL MASSES, POSTERIOR ELEMENTS: No fractures.  No dislocation.  No acute findings.

HARDWARE: None in the spine.

VISUALIZED RIBS: No fractures.

LUNG APICES AND SOFT TISSUES: No significant or acute findings.

OTHER: No other significant finding.



IMPRESSION:  NO ACUTE OR SIGNIFICANT FINDINGS IN THE CERVICAL SPINE.



TECHNICAL DOCUMENTATION:  JOB ID:  [id number]

Quality ID # 436: Final reports with documentation of one or more dose reduction techniques (e.g., Au
tomated exposure control, adjustment of the mA and/or kV according to patient size, use of iterative 
reconstruction technique)

 2011 [employer]- All Rights Reserved



Reading location - IP/workstation name: [email or web address]

## 2020-09-09 NOTE — ER DOCUMENT REPORT
ED Medical Screen (RME)





- General


Chief Complaint: Numbness


Stated Complaint: NUMBNESS TO ARMS,HANDS


Time Seen by Provider: 09/09/20 17:29


Primary Care Provider: 


LUCA MANCIA MD [Primary Care Provider] - Follow up as needed


Mode of Arrival: Ambulatory


Information source: Patient


Notes: 





25-year-old female presented to ED for complaint of multiple syncopal 4 episodes

yesterday.  She states she went to [facility] and fell yesterday she had numbness

and tingling in both arms and legs at the time.  She states they gave her some 

fluid and she felt better so she sounded AMA because it was her anniversary and 

she did not want to stay there.  She states she did not get any testing done.  

She states her blood pressure was 64/50 something and they let her leave AMA yes

terday.  She states she does have a past medical history of cervical and 

thoracic spine degenerative disc disease.  She states she has severe anemia and 

has had multiple blood transfusions.  She states she does not have any past 

surgical history.  She states her last menstrual period was 8/28/2020.  Her 

blood pressure when I examined her was 128/66 and her pulse was 84.  I will get 

blood to check her anemia and get a CT of the cervical spine and a x-ray of the 

thoracic spine and she will be seen by another provider.














I have greeted and performed a rapid initial assessment of this patient.  A 

comprehensive ED assessment and evaluation of the patient, analysis of test 

results and completion of medical decision making process will be conducted by 

an additional ED providers.


TRAVEL OUTSIDE OF THE [place] IN LAST 30 DAYS: No





- Related Data


Allergies/Adverse Reactions: 


                                        





amoxicillin Allergy (Verified 09/09/20 17:16)


   











Past Medical History





- Social History


Frequency of alcohol use: None


Drug Abuse: None


Renal/ Medical History: Denies: Hx Peritoneal Dialysis





- Immunizations


Immunizations up to date: Yes





Physical Exam





- Vital signs


Vitals: 





                                        











Temp Pulse Resp BP Pulse Ox


 


 98.6 F   82   18   135/86 H  100 


 


 09/09/20 17:04  09/09/20 17:04  09/09/20 17:04  09/09/20 17:04  09/09/20 17:04














Course





- Vital Signs


Vital signs: 





                                        











Temp Pulse Resp BP Pulse Ox


 


 98.6 F   84   18   128/66 H  100 


 


 09/09/20 17:04  09/09/20 17:36  09/09/20 17:04  09/09/20 17:36  09/09/20 17:04














Doctor's Discharge





- Discharge


Referrals: 


LUCA MANCIA MD [Primary Care Provider] - Follow up as needed

## 2020-09-10 VITALS — SYSTOLIC BLOOD PRESSURE: 115 MMHG | DIASTOLIC BLOOD PRESSURE: 81 MMHG

## 2020-09-10 LAB
BASE EXCESS BLDV CALC-SCNC: -0.5 MMOL/L
CK SERPL-CCNC: 51 U/L (ref 30–135)
HCO3 BLDV-SCNC: 24.7 MMOL/L (ref 20–32)
PCO2 BLDV: 42.6 MMHG (ref 35–63)
PH BLDV: 7.38 [PH] (ref 7.3–7.42)
PHOSPHATE SERPL-MCNC: 3.5 MG/DL (ref 2.5–4.5)

## 2020-09-10 NOTE — RADIOLOGY REPORT (SQ)
EXAM DESCRIPTION: 



CT HEAD WITHOUT IV CONTRAST



COMPLETED DATE/TME:  09/09/2020 23:57



CLINICAL HISTORY: 



25 years, Female, syncope, headaches, B/L arm/face tingling



COMPARISON:

None.



TECHNIQUE:

Noncontrast CT of the head was acquired. Coronal and sagittal

reformations were created.  Images stored on PACS.

 

All CT scanners at this facility use dose modulation, iterative

reconstruction, and/or weight based dosing when appropriate to

reduce radiation dose to as low as reasonably achievable (ALARA).





[facility]: Dose Right [facility]: CareDose   [facility]: Dose Right    [facility]:

Teradose 4D    [facility]: Smart Technologies



LIMITATIONS:

None.



FINDINGS:



Brain parenchyma is normal in attenuation. No acute intracranial

hemorrhage, mass effect, or extra-axial fluid is seen. The

ventricles, sulci, and basilar cisterns are normal in size and

configuration. Globes and orbits are normal. Paranasal sinuses

and mastoid air cells are clear. There are no depressed skull

fractures.





IMPRESSION:



No acute intracranial abnormality.

 

TECHNICAL DOCUMENTATION:



Quality ID # 436: Final reports with documentation of one or more

dose reduction techniques (e.g., Automated exposure control,

adjustment of the mA and/or kV according to patient size, use of

iterative reconstruction technique)



copyright 2011 [employer] All Rights Reserved

## 2020-09-10 NOTE — ER DOCUMENT REPORT
Doctor's Note


Notes: 





09/09/20 23:57


I was asked to evaluate this patient, she has been in the waiting area, she was 

seen by another APC earlier.  She has had some imaging done, she had a CT of the

cervical spine, and x-ray of the thoracic spine and lab work.  Her lab work 

shows that she has urinary tract infection.  I have spoken with the patient.  

Patient is tearful, scared, states that she is having altered sensation to both 

sides of her face, bilateral arms and legs.  She states that all of this started

last night, she was out to eat with her  when she had a syncopal episode.

 She was seen in another emergency department given IV fluids and states she 

felt better so she left.  She states that her symptoms have persisted through 

the day.  She has never had anything like this in the past.  Upon further 

questioning patient reports that she started having headaches over the last few 

months, she states they are severe like migraine headaches, she has not had an 

evaluation for these.  Prior to that she had an occasional headache but she 

states the headaches have been persistent.  





I feel that due to the patient's recent syncopal episodes, recurrent headaches 

and neurological symptoms she is in need of a head CT.  An order has been 

placed.  Have also spoken with the charge nurse and let her know that this 

patient cannot be discharged from triage, she needs to be seen by 1 of the 

providers in the main ED.





I have greeted and performed a rapid initial assessment of this patient.  A 

comprehensive ED assessment and evaluation of the patient, analysis of test 

results and completion of the medical decision making process will be conducted 

by additional ED providers.  I have specifically instructed the patient or famil

y members with the patient to immediately return to any nursing staff should 

anything change in the patient's condition or with their chief complaint.

## 2020-09-10 NOTE — ER DOCUMENT REPORT
ED General





- General


Chief Complaint: Numbness


Stated Complaint: NUMBNESS TO ARMS,HANDS


Time Seen by Provider: 09/09/20 17:29


Primary Care Provider: 


LUCA MANCIA MD [Primary Care Provider] - Follow up as needed


Mode of Arrival: Ambulatory


Notes: 





25-year-old female history of PTSD presents with approximately 1 day of diffuse 

body numbness and paresthesias.  Patient says that she was in her usual state of

health yesterday and went to dinner with her  and then began to feel 

vaguely nauseous and hot so she stood up to get some air outside and syncopized 

without trauma.  After syncope patient felt very lightheaded and EMS was 

activated and patient's blood pressure was low and patient was given fluids by 

EMS in route and felt improved so she left hospital AMA without work-up.  Since 

that time she has felt diffuse paresthesias in bilateral upper extremities and 

bilateral lower extremities that she thinks may have started in her hands and 

fingers but she is not sure.  Patient says she lost approximately 30 pounds over

last several months but this was with aggressive dieting and taking phentermine 

which she stopped approximately 1 month ago. patient also feels numbness in her 

bilateral upper and lower extremities and says that she is not able to do tasks 

unless she uses visual cues.  Patient denies SOB, any prior episodes, any focal 

numbness, weakness, trauma, dizziness, change in vision speech or gait, 

unexplained weight loss, headache, neck pain or back pain, fever, recent 

illnesses, sick contacts, diarrhea, vomiting, family history, sick contacts, 

drug use, alcohol use, change in meds, myalgia


TRAVEL OUTSIDE OF THE [place] IN LAST 30 DAYS: No





- Related Data


Allergies/Adverse Reactions: 


                                        





amoxicillin Allergy (Verified 09/09/20 17:16)


   











Past Medical History





- General


Information source: Patient





- Social History


Smoking Status: Never Smoker


Frequency of alcohol use: None


Drug Abuse: None


Family History: Reviewed & Not Pertinent


Renal/ Medical History: Denies: Hx Peritoneal Dialysis





- Immunizations


Immunizations up to date: Yes





Review of Systems





- Review of Systems


Notes: 





REVIEW OF SYSTEMS:


CONSTITUTIONAL :  Denies fever,  chills, or sweats. 


EENT: Denies recent cold/sinus symptoms, denies throat pain


CARDIOVASCULAR:  Denies chest pain, TERRELL


RESPIRATORY:  Denies cough, denies shortness of breath.


GASTROINTESTINAL:  Denies abdominal pain, nausea/vomiting.


GENITOURINARY:  Denies difficulty urinating, +painful urination.


FEMALE  GENITOURINARY:  Denies abnormal vaginal bleeding, vaginal discharge.


MUSCULOSKELETAL:  Denies neck pain, back pain.


SKIN:   Denies rash or skin lesions.


HEMATOLOGIC :   Denies easy bruising or bleeding.


LYMPHATIC:  Denies swollen, enlarged glands.


NEUROLOGICAL:  Denies headache, denies change in gait.


PSYCHIATRIC:  Denies anxiety or stress or depression.





Physical Exam





- Vital signs


Vitals: 


                                        











Temp Pulse Resp BP Pulse Ox


 


 98.6 F   82   18   135/86 H  100 


 


 09/09/20 17:04  09/09/20 17:04  09/09/20 17:04  09/09/20 17:04  09/09/20 17:04














- Notes


Notes: 





PHYSICAL EXAMINATION:


 


GENERAL: Well-appearing, well-nourished young adult female concerned about 

symptoms but comfortable appearing and in no acute distress.


 


HEAD: Atraumatic, normocephalic.


 


EYES: Pupils equal round and appropriate constriction, sclera anicteric, conjun

ctiva are normal.


 


ENT: nares patent, moist mucous membranes.


 


NECK: Normal range of motion, supple without lymphadenopathy


 


LUNGS: Breath sounds clear to auscultation bilaterally and equal.  No wheezes 

rales or rhonchi.  Normal respiratory effort


 


HEART: Regular rate and rhythm without murmurs


 


ABDOMEN: Soft, nontender, no guarding, no masses, no CVAT


 


EXTREMITIES: Normal range of motion, no pitting or edema.  No cyanosis.  DP and 

radial pulses 2+ bilaterally


 


NEUROLOGICAL: Awake, alert, conversing appropriately, moves all extremities 

spontaneously.  Cranial nerves II through XII intact bilaterally except reports 

decreased facial sensation in all distributions although able to sense light 

touch, normal finger-to-nose, 5 out of 5 strength in all 4 extremities, all 4 

extremities diffusely patient reports decreased and altered sensation but able 

to sense light touch, unable to elicit DTRs but limited by equipment 

availability and patient participation in exam





PSYCH: Normal mood, normal affect.


 


SKIN: Warm, Dry, normal turgor, no rashes or lesions noted.





Course





- Re-evaluation


Re-evalutation: 





09/10/20 06:44


Given global paresthesias and numbness presentation more suspicious for a 

metabolic disorder causing symptoms such as electrolyte abnormalities, rhabdo, 

acid-base disorder.  Guillain-Tomlinson less likely as patient had no known inciting

events (although positive dysuria on review of systems so obtained UA which 

shows UTI which uncommonly can be GBS trigger) and symptoms started in upper 

extremities and no weakness reported, but could be less common variation and 

merits neurology evaluation to rule out.  While patient was in ED she suddenly 

complains of having weakness and inability to move arms or legs.  Had been seen 

by the nurse approximately 5 minutes before without this complaint.  When I ev

aluated patient she cannot participate in exam and would not move arms or legs 

during my exam to evaluate strength but then was able to move arms and legs in 

order to turn over on side to comfortable sleeping position and to shrug 

shoulders and exasperation at symptoms. I discussed symptoms with neurologist at

[facility] (first contacted all known [facility] hospitals including [facility], and [facility] which all either said they had no beds or no neurology 

service) and patient was accepted for ED to ED transfer for neurology 

consultation.  Neurologist requested that I perform LP as elevated protein with 

few more suggestive of GBS diagnosis, after speaking with neurologist patient 

was observed walking to bathroom with narrow-based steady gait and then 

returning to bed which decreased likelihood significantly that LP will produce 

meaningful clinical data and that an invasive procedure was warranted for 

patient's current symptoms so LP was deferred.  Patient continues not to have a

ny respiratory symptoms, requested that respiratory performed PFTs including 

NIF.  Received update that bed in ED is available and patient will be 

transferred soon. Accepted by Dr. Leona Steiner and case discussed with neuro Dr. Romo.  Have turned over care to Dr. Wadsworth pending transfer to [facility] ED.





- Vital Signs


Vital signs: 


                                        











Temp Pulse Resp BP Pulse Ox


 


 97.8 F   88   15   115/81   99 


 


 09/10/20 04:15  09/10/20 00:23  09/10/20 07:18  09/10/20 07:18  09/10/20 07:18














- Laboratory


Result Diagrams: 


                                 09/09/20 18:11





                                 09/09/20 18:11


Laboratory results interpreted by me: 


                                        











  09/09/20 09/09/20 09/09/20





  18:11 18:11 18:11


 


Hgb  10.9 L  


 


Hct  31.7 L  


 


RDW  14.2 H  


 


ESR    32 H


 


Urine Blood   SMALL H 


 


Urine Nitrite   POSITIVE H 


 


Urine Urobilinogen   2.0 H 


 


Ur Leukocyte Esterase   MODERATE H 














Discharge





- Discharge


Clinical Impression: 


 Numbness and tingling





Disposition: [facility]


Referrals: 


LUCA MANCIA MD [Primary Care Provider] - Follow up as needed

## 2020-09-10 NOTE — ER DOCUMENT REPORT
ED Medical Screen (RME)





- General


Chief Complaint: Numbness


Stated Complaint: NUMBNESS TO ARMS,HANDS


Time Seen by Provider: 09/09/20 17:29


Primary Care Provider: 


LUCA MANCIA MD [Primary Care Provider] - Follow up as needed


Mode of Arrival: Ambulatory


Notes: 





Patient to be transferred to [facility].  7:20 AM examined patient.  Awake and alert 

symmetric face speech fluent good strength in all 4 extremities.  Still 

complaining of paresthesias.  Stable for transfer.


TRAVEL OUTSIDE OF THE [place] IN LAST 30 DAYS: No





- Related Data


Allergies/Adverse Reactions: 


                                        





amoxicillin Allergy (Verified 09/09/20 17:16)


   











Past Medical History





- Social History


Frequency of alcohol use: None


Drug Abuse: None


Renal/ Medical History: Denies: Hx Peritoneal Dialysis





- Immunizations


Immunizations up to date: Yes





Physical Exam





- Vital signs


Vitals: 





                                        











Temp Pulse Resp BP Pulse Ox


 


 98.6 F   82   18   135/86 H  100 


 


 09/09/20 17:04  09/09/20 17:04  09/09/20 17:04  09/09/20 17:04  09/09/20 17:04














Course





- Vital Signs


Vital signs: 





                                        











Temp Pulse Resp BP Pulse Ox


 


 97.8 F   88   18   115/73   99 


 


 09/10/20 04:15  09/10/20 00:23  09/10/20 00:23  09/10/20 04:15  09/10/20 04:15














- Laboratory


Result Diagrams: 


                                 09/09/20 18:11





                                 09/09/20 18:11


Laboratory results interpreted by me: 





                                        











  09/09/20 09/09/20 09/09/20





  18:11 18:11 18:11


 


Hgb  10.9 L  


 


Hct  31.7 L  


 


RDW  14.2 H  


 


ESR    32 H


 


Urine Blood   SMALL H 


 


Urine Nitrite   POSITIVE H 


 


Urine Urobilinogen   2.0 H 


 


Ur Leukocyte Esterase   MODERATE H 














Doctor's Discharge





- Discharge


Referrals: 


LUCA MANCIA MD [Primary Care Provider] - Follow up as needed